# Patient Record
Sex: MALE | Race: WHITE | NOT HISPANIC OR LATINO | Employment: OTHER | URBAN - METROPOLITAN AREA
[De-identification: names, ages, dates, MRNs, and addresses within clinical notes are randomized per-mention and may not be internally consistent; named-entity substitution may affect disease eponyms.]

---

## 2022-12-28 ENCOUNTER — OFFICE VISIT (OUTPATIENT)
Dept: PRIMARY CARE CLINIC | Facility: CLINIC | Age: 82
End: 2022-12-28
Payer: MEDICARE

## 2022-12-28 VITALS
OXYGEN SATURATION: 98 % | SYSTOLIC BLOOD PRESSURE: 122 MMHG | HEIGHT: 65 IN | TEMPERATURE: 98 F | HEART RATE: 84 BPM | BODY MASS INDEX: 28.82 KG/M2 | WEIGHT: 173 LBS | DIASTOLIC BLOOD PRESSURE: 70 MMHG

## 2022-12-28 DIAGNOSIS — J06.9 URI WITH COUGH AND CONGESTION: ICD-10-CM

## 2022-12-28 DIAGNOSIS — Z23 NEED FOR PROPHYLACTIC VACCINATION AND INOCULATION AGAINST VARICELLA: Primary | ICD-10-CM

## 2022-12-28 DIAGNOSIS — I10 ESSENTIAL HYPERTENSION, BENIGN: ICD-10-CM

## 2022-12-28 DIAGNOSIS — R53.83 FATIGUE, UNSPECIFIED TYPE: ICD-10-CM

## 2022-12-28 PROCEDURE — 99213 PR OFFICE/OUTPT VISIT, EST, LEVL III, 20-29 MIN: ICD-10-PCS | Mod: S$GLB,,, | Performed by: INTERNAL MEDICINE

## 2022-12-28 PROCEDURE — 1126F PR PAIN SEVERITY QUANTIFIED, NO PAIN PRESENT: ICD-10-PCS | Mod: CPTII,S$GLB,, | Performed by: INTERNAL MEDICINE

## 2022-12-28 PROCEDURE — 1159F MED LIST DOCD IN RCRD: CPT | Mod: CPTII,S$GLB,, | Performed by: INTERNAL MEDICINE

## 2022-12-28 PROCEDURE — 1160F RVW MEDS BY RX/DR IN RCRD: CPT | Mod: CPTII,S$GLB,, | Performed by: INTERNAL MEDICINE

## 2022-12-28 PROCEDURE — 1159F PR MEDICATION LIST DOCUMENTED IN MEDICAL RECORD: ICD-10-PCS | Mod: CPTII,S$GLB,, | Performed by: INTERNAL MEDICINE

## 2022-12-28 PROCEDURE — 99213 OFFICE O/P EST LOW 20 MIN: CPT | Mod: S$GLB,,, | Performed by: INTERNAL MEDICINE

## 2022-12-28 PROCEDURE — 1101F PT FALLS ASSESS-DOCD LE1/YR: CPT | Mod: CPTII,S$GLB,, | Performed by: INTERNAL MEDICINE

## 2022-12-28 PROCEDURE — 3288F PR FALLS RISK ASSESSMENT DOCUMENTED: ICD-10-PCS | Mod: CPTII,S$GLB,, | Performed by: INTERNAL MEDICINE

## 2022-12-28 PROCEDURE — 1160F PR REVIEW ALL MEDS BY PRESCRIBER/CLIN PHARMACIST DOCUMENTED: ICD-10-PCS | Mod: CPTII,S$GLB,, | Performed by: INTERNAL MEDICINE

## 2022-12-28 PROCEDURE — 3288F FALL RISK ASSESSMENT DOCD: CPT | Mod: CPTII,S$GLB,, | Performed by: INTERNAL MEDICINE

## 2022-12-28 PROCEDURE — 3074F PR MOST RECENT SYSTOLIC BLOOD PRESSURE < 130 MM HG: ICD-10-PCS | Mod: CPTII,S$GLB,, | Performed by: INTERNAL MEDICINE

## 2022-12-28 PROCEDURE — 3074F SYST BP LT 130 MM HG: CPT | Mod: CPTII,S$GLB,, | Performed by: INTERNAL MEDICINE

## 2022-12-28 PROCEDURE — 1126F AMNT PAIN NOTED NONE PRSNT: CPT | Mod: CPTII,S$GLB,, | Performed by: INTERNAL MEDICINE

## 2022-12-28 PROCEDURE — 3078F PR MOST RECENT DIASTOLIC BLOOD PRESSURE < 80 MM HG: ICD-10-PCS | Mod: CPTII,S$GLB,, | Performed by: INTERNAL MEDICINE

## 2022-12-28 PROCEDURE — 3078F DIAST BP <80 MM HG: CPT | Mod: CPTII,S$GLB,, | Performed by: INTERNAL MEDICINE

## 2022-12-28 PROCEDURE — 1101F PR PT FALLS ASSESS DOC 0-1 FALLS W/OUT INJ PAST YR: ICD-10-PCS | Mod: CPTII,S$GLB,, | Performed by: INTERNAL MEDICINE

## 2022-12-28 RX ORDER — AMLODIPINE BESYLATE 10 MG/1
TABLET ORAL
COMMUNITY
Start: 2022-08-12 | End: 2023-10-24 | Stop reason: SDUPTHER

## 2022-12-28 RX ORDER — CHOLECALCIFEROL (VITAMIN D3) 25 MCG
TABLET ORAL
COMMUNITY

## 2022-12-28 RX ORDER — ZOSTER VACCINE RECOMBINANT, ADJUVANTED 50 MCG/0.5
0.5 KIT INTRAMUSCULAR ONCE
Qty: 1 EACH | Refills: 0 | Status: SHIPPED | OUTPATIENT
Start: 2022-12-28 | End: 2022-12-28

## 2022-12-28 RX ORDER — FEXOFENADINE HCL AND PSEUDOEPHEDRINE HCI 60; 120 MG/1; MG/1
TABLET, EXTENDED RELEASE ORAL
COMMUNITY
End: 2023-07-24

## 2022-12-28 RX ORDER — IRBESARTAN 150 MG/1
TABLET ORAL
COMMUNITY
Start: 2022-10-18 | End: 2023-10-24 | Stop reason: SDUPTHER

## 2022-12-28 RX ORDER — MELOXICAM 7.5 MG/1
TABLET ORAL
COMMUNITY
End: 2023-10-24 | Stop reason: SDUPTHER

## 2022-12-28 RX ORDER — ALBUTEROL SULFATE 90 UG/1
2 AEROSOL, METERED RESPIRATORY (INHALATION) EVERY 4 HOURS PRN
COMMUNITY
Start: 2022-12-09 | End: 2023-04-24 | Stop reason: SDUPTHER

## 2022-12-28 RX ORDER — SILDENAFIL 100 MG/1
TABLET, FILM COATED ORAL
COMMUNITY

## 2022-12-28 RX ORDER — MECLIZINE HYDROCHLORIDE 25 MG/1
TABLET ORAL
COMMUNITY
End: 2023-01-23 | Stop reason: SDUPTHER

## 2022-12-28 RX ORDER — BENZONATATE 100 MG/1
CAPSULE ORAL
COMMUNITY
End: 2023-11-27 | Stop reason: SDUPTHER

## 2022-12-28 RX ORDER — BROLUCIZUMAB 6 MG/.05ML
INJECTION, SOLUTION INTRAVITREAL
COMMUNITY

## 2022-12-28 NOTE — PROGRESS NOTES
Subjective:       Patient ID: Carlos Rodas is a 82 y.o. male.    Chief Complaint: Cough (Ongoing since 12/9. Chest xray performed at [facility].)      Cough  This is a chronic problem. The problem has been unchanged. The problem occurs every few hours. The cough is Non-productive. Nothing aggravates the symptoms. Risk factors for lung disease include smoking/tobacco exposure. He has tried prescription cough suppressant and a beta-agonist inhaler for the symptoms. The treatment provided mild relief.   Review of Systems   Respiratory:  Positive for cough.        Objective:      Physical Exam  Vitals and nursing note reviewed.   Constitutional:       Appearance: Normal appearance. He is overweight.   HENT:      Head: Normocephalic and atraumatic.      Right Ear: Tympanic membrane normal.      Left Ear: Tympanic membrane normal.      Nose: Nose normal.      Mouth/Throat:      Mouth: Mucous membranes are moist.      Pharynx: Oropharynx is clear. Posterior oropharyngeal erythema present.   Cardiovascular:      Rate and Rhythm: Normal rate and regular rhythm.   Pulmonary:      Effort: Pulmonary effort is normal. No respiratory distress.      Breath sounds: Normal breath sounds. No wheezing, rhonchi or rales.   Neurological:      Mental Status: He is alert.       Assessment:       1. Need for prophylactic vaccination and inoculation against varicella  -     varicella-zoster gE-AS01B, PF, (SHINGRIX, PF,) 50 mcg/0.5 mL injection; Inject 0.5 mLs into the muscle once. for 1 dose  Dispense: 1 each; Refill: 0    2. Essential hypertension, benign  -     Comprehensive Metabolic Panel; Future; Expected date: 12/30/2022    3. Fatigue, unspecified type  -     TSH; Future; Expected date: 12/30/2022    4. URI with cough and congestion  Assessment & Plan:  With chronic cough.he was seen at [facility] on 12/9/22 and was Rx with doxycycline , albuterol ..his rapid flu & COVID were neg at that time but still c/o dry nagging cough  No f/c, no SOB    His CXR was normal 2 weeks ago  His rapid Strep test was neg today               Plan:       1. Need for prophylactic vaccination and inoculation against varicella  -     varicella-zoster [id number], PF, (SHINGRIX, PF,) 50 mcg/0.5 mL injection; Inject 0.5 mLs into the muscle once. for 1 dose  Dispense: 1 each; Refill: 0    2. Essential hypertension, benign  -     Comprehensive Metabolic Panel; Future; Expected date: 12/30/2022    3. Fatigue, unspecified type  -     TSH; Future; Expected date: 12/30/2022    4. URI with cough and congestion  Assessment & Plan:  With chronic cough.[facility] and was Rx with doxycycline , albuterol ..his rapid flu & COVID were neg at that time but still c/o dry nagging cough  No f/c, no SOB   Add Dayquil/Nyquil as needed    RTC for routine labs in 1-2 M

## 2022-12-29 PROBLEM — J06.9 URI WITH COUGH AND CONGESTION: Status: ACTIVE | Noted: 2022-12-29

## 2022-12-30 ENCOUNTER — TELEPHONE (OUTPATIENT)
Dept: PRIMARY CARE CLINIC | Facility: CLINIC | Age: 82
End: 2022-12-30
Payer: MEDICARE

## 2022-12-30 NOTE — ASSESSMENT & PLAN NOTE
With chronic cough.[facility] was [facility]/22 and was Rx with doxycycline , albuterol ..his rapid flu & COVID were neg at that time but still c/o dry nagging cough  No f/c, no SOB

## 2022-12-30 NOTE — TELEPHONE ENCOUNTER
----- Message from Bebeto Bustamante MD sent at 12/29/2022  5:40 PM CST -----  Regarding: cough  Contact: PT  Rich [place]...please ask him to try Dayquil for daytime Sx and Nyquil for night time Sx for another week  Ty    MM  ----- Message -----  From: Niurka Bennett  Sent: 12/29/2022   9:21 AM CST  To: Bebeto Bustamante MD, #    Type: Needs Medical Advice  Who Called: Carlos/ PT  Symptoms (please be specific):  Cough  How long has patient had these symptoms:  About 2 weeks  Pharmacy name and phone #:   [facility] - [place], [place] - [address] [facility]  [id number] [facility]  [place] [address]  Phone: [phone] Fax: [phone]    Best Call Back Number: [phone]  Additional Information: PT asking  to get something called in for the cough. Current medication is not helping

## 2023-01-04 ENCOUNTER — TELEPHONE (OUTPATIENT)
Dept: FAMILY MEDICINE | Facility: CLINIC | Age: 83
End: 2023-01-04
Payer: MEDICARE

## 2023-01-04 NOTE — TELEPHONE ENCOUNTER
Call placed to patient due to message left due message left, spoke with patient states was given medication by Dr. Varghese, states doing better at this time.    ----- Message from Niurka Bennett sent at 12/29/2022  9:19 AM CST -----  Contact: PT  Type: Needs Medical Advice  Who Called: Carlos/ PT  Symptoms (please be specific):  Cough  How long has patient had these symptoms:  About 2 weeks  Pharmacy name and phone #:   [facility] - [place], [place] - [address] [facility]  [address]  [place] [address]  Phone: [phone] Fax: [phone]    Best Call Back Number: [phone]  Additional Information: PT asking  to get something called in for the cough. Current medication is not helping

## 2023-01-23 ENCOUNTER — OFFICE VISIT (OUTPATIENT)
Dept: PRIMARY CARE CLINIC | Facility: CLINIC | Age: 83
End: 2023-01-23
Payer: MEDICARE

## 2023-01-23 VITALS
HEART RATE: 89 BPM | TEMPERATURE: 97 F | OXYGEN SATURATION: 94 % | DIASTOLIC BLOOD PRESSURE: 58 MMHG | HEIGHT: 65 IN | BODY MASS INDEX: 29.49 KG/M2 | WEIGHT: 177 LBS | SYSTOLIC BLOOD PRESSURE: 124 MMHG

## 2023-01-23 DIAGNOSIS — R42 VERTIGO: ICD-10-CM

## 2023-01-23 DIAGNOSIS — E78.2 MIXED HYPERLIPIDEMIA: ICD-10-CM

## 2023-01-23 DIAGNOSIS — I10 ESSENTIAL HYPERTENSION, BENIGN: Primary | ICD-10-CM

## 2023-01-23 DIAGNOSIS — R73.9 ELEVATED BLOOD SUGAR: ICD-10-CM

## 2023-01-23 PROCEDURE — 1101F PT FALLS ASSESS-DOCD LE1/YR: CPT | Mod: CPTII,S$GLB,, | Performed by: INTERNAL MEDICINE

## 2023-01-23 PROCEDURE — 3074F SYST BP LT 130 MM HG: CPT | Mod: CPTII,S$GLB,, | Performed by: INTERNAL MEDICINE

## 2023-01-23 PROCEDURE — 3074F PR MOST RECENT SYSTOLIC BLOOD PRESSURE < 130 MM HG: ICD-10-PCS | Mod: CPTII,S$GLB,, | Performed by: INTERNAL MEDICINE

## 2023-01-23 PROCEDURE — 1126F AMNT PAIN NOTED NONE PRSNT: CPT | Mod: CPTII,S$GLB,, | Performed by: INTERNAL MEDICINE

## 2023-01-23 PROCEDURE — 3288F FALL RISK ASSESSMENT DOCD: CPT | Mod: CPTII,S$GLB,, | Performed by: INTERNAL MEDICINE

## 2023-01-23 PROCEDURE — 99213 OFFICE O/P EST LOW 20 MIN: CPT | Mod: S$GLB,,, | Performed by: INTERNAL MEDICINE

## 2023-01-23 PROCEDURE — 3078F PR MOST RECENT DIASTOLIC BLOOD PRESSURE < 80 MM HG: ICD-10-PCS | Mod: CPTII,S$GLB,, | Performed by: INTERNAL MEDICINE

## 2023-01-23 PROCEDURE — 3288F PR FALLS RISK ASSESSMENT DOCUMENTED: ICD-10-PCS | Mod: CPTII,S$GLB,, | Performed by: INTERNAL MEDICINE

## 2023-01-23 PROCEDURE — 1159F PR MEDICATION LIST DOCUMENTED IN MEDICAL RECORD: ICD-10-PCS | Mod: CPTII,S$GLB,, | Performed by: INTERNAL MEDICINE

## 2023-01-23 PROCEDURE — 1101F PR PT FALLS ASSESS DOC 0-1 FALLS W/OUT INJ PAST YR: ICD-10-PCS | Mod: CPTII,S$GLB,, | Performed by: INTERNAL MEDICINE

## 2023-01-23 PROCEDURE — 1160F PR REVIEW ALL MEDS BY PRESCRIBER/CLIN PHARMACIST DOCUMENTED: ICD-10-PCS | Mod: CPTII,S$GLB,, | Performed by: INTERNAL MEDICINE

## 2023-01-23 PROCEDURE — 3078F DIAST BP <80 MM HG: CPT | Mod: CPTII,S$GLB,, | Performed by: INTERNAL MEDICINE

## 2023-01-23 PROCEDURE — 1159F MED LIST DOCD IN RCRD: CPT | Mod: CPTII,S$GLB,, | Performed by: INTERNAL MEDICINE

## 2023-01-23 PROCEDURE — 1160F RVW MEDS BY RX/DR IN RCRD: CPT | Mod: CPTII,S$GLB,, | Performed by: INTERNAL MEDICINE

## 2023-01-23 PROCEDURE — 1126F PR PAIN SEVERITY QUANTIFIED, NO PAIN PRESENT: ICD-10-PCS | Mod: CPTII,S$GLB,, | Performed by: INTERNAL MEDICINE

## 2023-01-23 PROCEDURE — 99213 PR OFFICE/OUTPT VISIT, EST, LEVL III, 20-29 MIN: ICD-10-PCS | Mod: S$GLB,,, | Performed by: INTERNAL MEDICINE

## 2023-01-23 RX ORDER — MECLIZINE HYDROCHLORIDE 25 MG/1
25 TABLET ORAL 2 TIMES DAILY PRN
Qty: 60 TABLET | Refills: 0 | Status: SHIPPED | OUTPATIENT
Start: 2023-01-23 | End: 2023-07-24

## 2023-01-23 NOTE — PROGRESS NOTES
Subjective:       Patient ID: Carlos Rodas is a 83 y.o. male.    Chief Complaint: Establish Care (Patient presents for establishing care, need refills on medication.)      Hypertension  This is a recurrent problem. The current episode started more than 1 year ago. The problem has been gradually improving since onset. The problem is controlled. There are no associated agents to hypertension. Risk factors for coronary artery disease include dyslipidemia, male gender and sedentary lifestyle. Past treatments include angiotensin blockers and calcium channel blockers. The current treatment provides significant improvement. Hypertensive end-organ damage includes kidney disease. Identifiable causes of hypertension include chronic renal disease.   Review of Systems   Constitutional:  Negative for activity change, appetite change and fever.   Respiratory: Negative.          His cough has resolved   Cardiovascular: Negative.    Gastrointestinal: Negative.    Musculoskeletal: Negative.        Objective:      Physical Exam  Vitals (overweight) and nursing note reviewed.   Constitutional:       Appearance: Normal appearance.   Cardiovascular:      Rate and Rhythm: Normal rate and regular rhythm.      Pulses: Normal pulses.      Heart sounds: Normal heart sounds. No murmur heard.    No friction rub. No gallop.   Pulmonary:      Effort: Pulmonary effort is normal.      Breath sounds: Normal breath sounds. No wheezing.   Abdominal:      General: Abdomen is flat. Bowel sounds are normal.      Palpations: Abdomen is soft.   Musculoskeletal:         General: Normal range of motion.   Skin:     General: Skin is warm and dry.   Neurological:      General: No focal deficit present.      Mental Status: He is alert and oriented to person, place, and time.   Psychiatric:         Mood and Affect: Mood normal.       Assessment:       1. Essential hypertension, benign  Overview:  Blood pressure is controlled today at 108/54 manually on the  left side    Assessment & Plan:  Continue current medication of amlodipine and irbesartan    Orders:  -     Comprehensive Metabolic Panel; Future; Expected date: 01/23/2023    2. Mixed hyperlipidemia  -     Comprehensive Metabolic Panel; Future; Expected date: 01/23/2023  -     Lipid Panel; Future; Expected date: 01/23/2023    3. Elevated blood sugar  -     Hemoglobin A1C; Future; Expected date: 01/23/2023    4. Vertigo  Overview:  Stable on meclizine  Denies any syncopal attacks denies any fever chills denies any headache    Assessment & Plan:  Reviewed meclizine and monitor closely      Other orders  -     meclizine (ANTIVERT) 25 mg tablet; Take 1 tablet (25 mg total) by mouth 2 (two) times daily as needed for Dizziness.  Dispense: 60 tablet; Refill: 0             Plan:       1. Essential hypertension, benign  Overview:  Blood pressure is controlled today at 108/54 manually on the left side    Assessment & Plan:  Continue current medication of amlodipine and irbesartan    Orders:  -     Comprehensive Metabolic Panel; Future; Expected date: 01/23/2023    2. Mixed hyperlipidemia  -     Comprehensive Metabolic Panel; Future; Expected date: 01/23/2023  -     Lipid Panel; Future; Expected date: 01/23/2023    3. Elevated blood sugar  -     Hemoglobin A1C; Future; Expected date: 01/23/2023    4. Vertigo  Overview:  Stable on meclizine  Denies any syncopal attacks denies any fever chills denies any headache    Assessment & Plan:  Reviewed meclizine and monitor closely      Other orders  -     meclizine (ANTIVERT) 25 mg tablet; Take 1 tablet (25 mg total) by mouth 2 (two) times daily as needed for Dizziness.  Dispense: 60 tablet; Refill: 0

## 2023-02-20 ENCOUNTER — OFFICE VISIT (OUTPATIENT)
Dept: PRIMARY CARE CLINIC | Facility: CLINIC | Age: 83
End: 2023-02-20
Payer: MEDICARE

## 2023-02-20 VITALS
HEIGHT: 65 IN | BODY MASS INDEX: 29.37 KG/M2 | HEART RATE: 75 BPM | DIASTOLIC BLOOD PRESSURE: 58 MMHG | SYSTOLIC BLOOD PRESSURE: 110 MMHG | OXYGEN SATURATION: 94 % | TEMPERATURE: 98 F | WEIGHT: 176.31 LBS

## 2023-02-20 DIAGNOSIS — N18.32 STAGE 3B CHRONIC KIDNEY DISEASE: ICD-10-CM

## 2023-02-20 DIAGNOSIS — M45.9 ANKYLOSING SPONDYLITIS, UNSPECIFIED SITE OF SPINE: ICD-10-CM

## 2023-02-20 DIAGNOSIS — J06.9 URI WITH COUGH AND CONGESTION: ICD-10-CM

## 2023-02-20 DIAGNOSIS — J35.8 TONSILLITH: Primary | ICD-10-CM

## 2023-02-20 DIAGNOSIS — J35.1 TONSILLAR ENLARGEMENT: ICD-10-CM

## 2023-02-20 PROCEDURE — 3078F PR MOST RECENT DIASTOLIC BLOOD PRESSURE < 80 MM HG: ICD-10-PCS | Mod: CPTII,S$GLB,, | Performed by: INTERNAL MEDICINE

## 2023-02-20 PROCEDURE — 3074F SYST BP LT 130 MM HG: CPT | Mod: CPTII,S$GLB,, | Performed by: INTERNAL MEDICINE

## 2023-02-20 PROCEDURE — 1159F MED LIST DOCD IN RCRD: CPT | Mod: CPTII,S$GLB,, | Performed by: INTERNAL MEDICINE

## 2023-02-20 PROCEDURE — 96372 PR INJECTION,THERAP/PROPH/DIAG2ST, IM OR SUBCUT: ICD-10-PCS | Mod: S$GLB,,, | Performed by: INTERNAL MEDICINE

## 2023-02-20 PROCEDURE — 99213 OFFICE O/P EST LOW 20 MIN: CPT | Mod: 25,S$GLB,, | Performed by: INTERNAL MEDICINE

## 2023-02-20 PROCEDURE — 1160F PR REVIEW ALL MEDS BY PRESCRIBER/CLIN PHARMACIST DOCUMENTED: ICD-10-PCS | Mod: CPTII,S$GLB,, | Performed by: INTERNAL MEDICINE

## 2023-02-20 PROCEDURE — 99213 PR OFFICE/OUTPT VISIT, EST, LEVL III, 20-29 MIN: ICD-10-PCS | Mod: 25,S$GLB,, | Performed by: INTERNAL MEDICINE

## 2023-02-20 PROCEDURE — 1159F PR MEDICATION LIST DOCUMENTED IN MEDICAL RECORD: ICD-10-PCS | Mod: CPTII,S$GLB,, | Performed by: INTERNAL MEDICINE

## 2023-02-20 PROCEDURE — 96372 THER/PROPH/DIAG INJ SC/IM: CPT | Mod: S$GLB,,, | Performed by: INTERNAL MEDICINE

## 2023-02-20 PROCEDURE — 3074F PR MOST RECENT SYSTOLIC BLOOD PRESSURE < 130 MM HG: ICD-10-PCS | Mod: CPTII,S$GLB,, | Performed by: INTERNAL MEDICINE

## 2023-02-20 PROCEDURE — 1160F RVW MEDS BY RX/DR IN RCRD: CPT | Mod: CPTII,S$GLB,, | Performed by: INTERNAL MEDICINE

## 2023-02-20 PROCEDURE — 3078F DIAST BP <80 MM HG: CPT | Mod: CPTII,S$GLB,, | Performed by: INTERNAL MEDICINE

## 2023-02-20 RX ORDER — CEFTRIAXONE 500 MG/1
500 INJECTION, POWDER, FOR SOLUTION INTRAMUSCULAR; INTRAVENOUS ONCE
Status: COMPLETED | OUTPATIENT
Start: 2023-02-20 | End: 2023-02-20

## 2023-02-20 RX ORDER — AFLIBERCEPT 40 MG/ML
INJECTION, SOLUTION INTRAVITREAL
COMMUNITY
Start: 2023-02-06

## 2023-02-20 RX ORDER — AMOXICILLIN AND CLAVULANATE POTASSIUM 875; 125 MG/1; MG/1
1 TABLET, FILM COATED ORAL 2 TIMES DAILY
Qty: 10 TABLET | Refills: 0 | Status: SHIPPED | OUTPATIENT
Start: 2023-02-20 | End: 2023-02-25

## 2023-02-20 RX ADMIN — CEFTRIAXONE 500 MG: 500 INJECTION, POWDER, FOR SOLUTION INTRAMUSCULAR; INTRAVENOUS at 12:02

## 2023-02-20 NOTE — PROGRESS NOTES
Subjective:       Patient ID: Carlos Rodas is a 83 y.o. male.    Chief Complaint: Follow-up and Cough (Since December re-occurring never went away completely )      Patient is established already .......... presents today with c/o URI       Follow-up  Associated symptoms include coughing.   Cough    Review of Systems   Respiratory:  Positive for cough.        Objective:      Physical Exam  Vitals and nursing note reviewed.   Constitutional:       Appearance: Normal appearance. He is obese.   HENT:      Mouth/Throat:      Pharynx: Posterior oropharyngeal erythema present.      Comments: Enlarged tonsils  Cardiovascular:      Rate and Rhythm: Normal rate and regular rhythm.      Pulses: Normal pulses.      Heart sounds: Normal heart sounds. No murmur heard.    No friction rub. No gallop.   Pulmonary:      Effort: Pulmonary effort is normal.      Breath sounds: Normal breath sounds. No wheezing.   Abdominal:      General: Abdomen is flat. Bowel sounds are normal.      Palpations: Abdomen is soft.   Musculoskeletal:         General: Normal range of motion.   Skin:     General: Skin is warm and dry.   Neurological:      General: No focal deficit present.      Mental Status: He is alert and oriented to person, place, and time.   Psychiatric:         Mood and Affect: Mood normal.       Assessment:       1. Tonsillitis  -     cefTRIAXone injection 500 mg    2. Ankylosing spondylitis, unspecified site of spine    3. Stage 3b chronic kidney disease    4. URI with cough and congestion  Overview:  Upper Respiratory Infection: Patient complains of symptoms of a URI. Symptoms include congestion and cough. Onset of symptoms was 3 days ago, gradually improving since that time. He also c/o cough described as nonproductive for the past 2 days .  He is drinking plenty of fluids. Evaluation to date: seen previously and thought to have a viral URI. Treatment to date: cough suppressants.          Assessment & Plan:  Tonsillitis  Add  augmentin  I gave him 1/2 gr of rocephin IM today  T/c ENT ref      Other orders  -     amoxicillin-clavulanate 875-125mg (AUGMENTIN) 875-125 mg per tablet; Take 1 tablet by mouth 2 (two) times daily. for 5 days  Dispense: 10 tablet; Refill: 0             Plan:       1. Tonsillith  -     cefTRIAXone injection 500 mg    2. Ankylosing spondylitis, unspecified site of spine    3. Stage 3b chronic kidney disease    4. URI with cough and congestion  Overview:  Upper Respiratory Infection: Patient complains of symptoms of a URI. Symptoms include congestion and cough. Onset of symptoms was 3 days ago, gradually improving since that time. He also c/o cough described as nonproductive for the past 2 days .  He is drinking plenty of fluids. Evaluation to date: seen previously and thought to have a viral URI. Treatment to date: cough suppressants.          Assessment & Plan:  Tonsillitis  Add augmentin  I gave him 1/2 gr of rocephin IM today  T/c ENT ref      Other orders  -     amoxicillin-clavulanate 875-125mg (AUGMENTIN) 875-125 mg per tablet; Take 1 tablet by mouth 2 (two) times daily. for 5 days  Dispense: 10 tablet; Refill: 0

## 2023-03-07 ENCOUNTER — PATIENT OUTREACH (OUTPATIENT)
Dept: ADMINISTRATIVE | Facility: HOSPITAL | Age: 83
End: 2023-03-07
Payer: MEDICARE

## 2023-03-07 NOTE — PROGRESS NOTES
Records Received, hyper-linked into chart at this time. The following record(s)  below were uploaded for Health Maintenance .    X1    IMMUNIZATIONS

## 2023-03-24 ENCOUNTER — TELEPHONE (OUTPATIENT)
Dept: LAB | Facility: CLINIC | Age: 83
End: 2023-03-24
Payer: MEDICARE

## 2023-03-24 NOTE — TELEPHONE ENCOUNTER
I sent message to Dr Varghese to put in Orders for Xray if he thinks he doesn't need to be seen first.

## 2023-03-24 NOTE — TELEPHONE ENCOUNTER
----- Message from Sabine Aguirre sent at 3/24/2023  1:11 PM CDT -----  Contact: pt  Type: pt has fallen      Who Called:  pt  Symptoms (please be specific):  pt just fell 2x times   How long has patient had these symptoms:  and neck hurts and limited     Pt wanting a x-ray or MRI and hurting wants you to call    Wants to go to [facility]    [facility] Call Back Number: [phone]    Additional Information:

## 2023-03-24 NOTE — TELEPHONE ENCOUNTER
----- Message from Galilea Angeles sent at 3/24/2023  1:21 PM CDT -----  Type: Needs Medical Advice  Who Called:  peyton   Symptoms (please be specific):  neck pain  How long has patient had these symptoms:  today   Best Call Back Number: [phone]    Additional Information: pt fell and wants someone to call him regarding getting [facility] to assist

## 2023-03-27 ENCOUNTER — TELEPHONE (OUTPATIENT)
Dept: LAB | Facility: CLINIC | Age: 83
End: 2023-03-27
Payer: MEDICARE

## 2023-03-27 NOTE — TELEPHONE ENCOUNTER
I tried calling pt to schedule for xray orders per Dr Varghese. Please schedule pt if he calls back.  Thanks

## 2023-04-10 ENCOUNTER — TELEPHONE (OUTPATIENT)
Dept: FAMILY MEDICINE | Facility: CLINIC | Age: 83
End: 2023-04-10
Payer: MEDICARE

## 2023-04-10 NOTE — TELEPHONE ENCOUNTER
----- Message from Magalys Fernandez sent at 4/10/2023 11:45 AM CDT -----  Contact: pt  Pt is calling wanting orders for blood work sen to [facility]   Please give pt a call back [phone]

## 2023-04-12 ENCOUNTER — TELEPHONE (OUTPATIENT)
Dept: FAMILY MEDICINE | Facility: CLINIC | Age: 83
End: 2023-04-12
Payer: MEDICARE

## 2023-04-12 NOTE — TELEPHONE ENCOUNTER
----- Message from Kathe Clancy sent at 4/12/2023  2:30 PM CDT -----  Contact: PT  PLEASE CALL PT TO CONFIRM THAT LAB ORDERS HAVE BEEN SENT TO [facility].   [phone] (home)   THANK YOU

## 2023-04-24 ENCOUNTER — OFFICE VISIT (OUTPATIENT)
Dept: PRIMARY CARE CLINIC | Facility: CLINIC | Age: 83
End: 2023-04-24
Payer: MEDICARE

## 2023-04-24 VITALS
HEIGHT: 65 IN | RESPIRATION RATE: 16 BRPM | WEIGHT: 179.5 LBS | DIASTOLIC BLOOD PRESSURE: 64 MMHG | SYSTOLIC BLOOD PRESSURE: 126 MMHG | TEMPERATURE: 98 F | OXYGEN SATURATION: 94 % | HEART RATE: 69 BPM | BODY MASS INDEX: 29.91 KG/M2

## 2023-04-24 DIAGNOSIS — F51.01 PRIMARY INSOMNIA: ICD-10-CM

## 2023-04-24 DIAGNOSIS — S92.901D CLOSED FRACTURE OF RIGHT FOOT WITH ROUTINE HEALING: ICD-10-CM

## 2023-04-24 PROCEDURE — 1125F AMNT PAIN NOTED PAIN PRSNT: CPT | Mod: CPTII,S$GLB,, | Performed by: INTERNAL MEDICINE

## 2023-04-24 PROCEDURE — 1159F PR MEDICATION LIST DOCUMENTED IN MEDICAL RECORD: ICD-10-PCS | Mod: CPTII,S$GLB,, | Performed by: INTERNAL MEDICINE

## 2023-04-24 PROCEDURE — 3074F SYST BP LT 130 MM HG: CPT | Mod: CPTII,S$GLB,, | Performed by: INTERNAL MEDICINE

## 2023-04-24 PROCEDURE — 99213 OFFICE O/P EST LOW 20 MIN: CPT | Mod: S$GLB,,, | Performed by: INTERNAL MEDICINE

## 2023-04-24 PROCEDURE — 1101F PT FALLS ASSESS-DOCD LE1/YR: CPT | Mod: CPTII,S$GLB,, | Performed by: INTERNAL MEDICINE

## 2023-04-24 PROCEDURE — 99213 PR OFFICE/OUTPT VISIT, EST, LEVL III, 20-29 MIN: ICD-10-PCS | Mod: S$GLB,,, | Performed by: INTERNAL MEDICINE

## 2023-04-24 PROCEDURE — 3074F PR MOST RECENT SYSTOLIC BLOOD PRESSURE < 130 MM HG: ICD-10-PCS | Mod: CPTII,S$GLB,, | Performed by: INTERNAL MEDICINE

## 2023-04-24 PROCEDURE — 3288F PR FALLS RISK ASSESSMENT DOCUMENTED: ICD-10-PCS | Mod: CPTII,S$GLB,, | Performed by: INTERNAL MEDICINE

## 2023-04-24 PROCEDURE — 1159F MED LIST DOCD IN RCRD: CPT | Mod: CPTII,S$GLB,, | Performed by: INTERNAL MEDICINE

## 2023-04-24 PROCEDURE — 3288F FALL RISK ASSESSMENT DOCD: CPT | Mod: CPTII,S$GLB,, | Performed by: INTERNAL MEDICINE

## 2023-04-24 PROCEDURE — 1160F PR REVIEW ALL MEDS BY PRESCRIBER/CLIN PHARMACIST DOCUMENTED: ICD-10-PCS | Mod: CPTII,S$GLB,, | Performed by: INTERNAL MEDICINE

## 2023-04-24 PROCEDURE — 3078F PR MOST RECENT DIASTOLIC BLOOD PRESSURE < 80 MM HG: ICD-10-PCS | Mod: CPTII,S$GLB,, | Performed by: INTERNAL MEDICINE

## 2023-04-24 PROCEDURE — 1125F PR PAIN SEVERITY QUANTIFIED, PAIN PRESENT: ICD-10-PCS | Mod: CPTII,S$GLB,, | Performed by: INTERNAL MEDICINE

## 2023-04-24 PROCEDURE — 1160F RVW MEDS BY RX/DR IN RCRD: CPT | Mod: CPTII,S$GLB,, | Performed by: INTERNAL MEDICINE

## 2023-04-24 PROCEDURE — 1101F PR PT FALLS ASSESS DOC 0-1 FALLS W/OUT INJ PAST YR: ICD-10-PCS | Mod: CPTII,S$GLB,, | Performed by: INTERNAL MEDICINE

## 2023-04-24 PROCEDURE — 3078F DIAST BP <80 MM HG: CPT | Mod: CPTII,S$GLB,, | Performed by: INTERNAL MEDICINE

## 2023-04-24 RX ORDER — HYDROCODONE BITARTRATE AND ACETAMINOPHEN 5; 325 MG/1; MG/1
1-2 TABLET ORAL
COMMUNITY
Start: 2023-04-13 | End: 2023-07-24

## 2023-04-24 RX ORDER — ALBUTEROL SULFATE 90 UG/1
2 AEROSOL, METERED RESPIRATORY (INHALATION) EVERY 4 HOURS PRN
Qty: 18 G | Refills: 2 | Status: SHIPPED | OUTPATIENT
Start: 2023-04-24 | End: 2023-07-24

## 2023-04-24 RX ORDER — TRAZODONE HYDROCHLORIDE 150 MG/1
150 TABLET ORAL NIGHTLY
Qty: 30 TABLET | Refills: 2 | Status: SHIPPED | OUTPATIENT
Start: 2023-04-24 | End: 2023-07-24

## 2023-04-24 NOTE — PROGRESS NOTES
Subjective:       Patient ID: Carlos Rodas is a 83 y.o. male.    Chief Complaint: Follow-up (3 Month - Lab Results), Fall (At home 3 days ago), and Insomnia      Patient is established already .......... presents today for a f/u visit , to discuss labs results and for management of the chronic conditions and a new c/o sleep problems        Follow-up  This is a chronic problem. The current episode started more than 1 year ago. The problem occurs intermittently. The problem has been gradually worsening. Associated symptoms include neck pain. Pertinent negatives include no fever. Associated symptoms comments: Sleep problems. Nothing aggravates the symptoms. He has tried nothing for the symptoms.   Fall  Pertinent negatives include no fever.   Neck Pain   This is a new problem. The current episode started in the past 7 days. The problem occurs intermittently. The problem has been gradually worsening. The pain is associated with a fall. The pain is present in the occipital region and right side. The quality of the pain is described as aching. The pain is at a severity of 5/10. The pain is moderate. The symptoms are aggravated by twisting and position. The pain is Worse during the night. Pertinent negatives include no fever. The treatment provided moderate relief.   Review of Systems   Constitutional:  Negative for activity change, appetite change and fever.   Respiratory: Negative.     Cardiovascular: Negative.    Gastrointestinal: Negative.    Musculoskeletal:  Positive for neck pain.       Objective:      Physical Exam  Vitals and nursing note reviewed.   Constitutional:       Appearance: Normal appearance.      Comments: Elderly frail WM   Cardiovascular:      Rate and Rhythm: Normal rate and regular rhythm.      Pulses: Normal pulses.      Heart sounds: Normal heart sounds. No murmur heard.    No friction rub. No gallop.   Pulmonary:      Effort: Pulmonary effort is normal.      Breath sounds: Normal breath sounds.  No wheezing.   Musculoskeletal:         General: Normal range of motion.   Skin:     General: Skin is warm and dry.   Neurological:      General: No focal deficit present.      Mental Status: He is alert.       Assessment:       1. Closed fracture of right foot with routine healing  Overview:  After a fall 3 days    Assessment & Plan:  Tripped at home and fell , hit head , broke R foot  Seen by Ortho      2. Primary insomnia  Overview:  Have diff staying asleep    Assessment & Plan:  Add trazodone   T/c melatonin      Other orders  -     traZODone (DESYREL) 150 MG tablet; Take 1 tablet (150 mg total) by mouth nightly.  Dispense: 30 tablet; Refill: 2             Plan:       1. Closed fracture of right foot with routine healing  Overview:  After a fall 3 days    Assessment & Plan:  Tripped at home and fell , hit head , broke R foot  Seen by Ortho      2. Primary insomnia  Overview:  Have diff staying asleep    Assessment & Plan:  Add trazodone   T/c melatonin      Other orders  -     traZODone (DESYREL) 150 MG tablet; Take 1 tablet (150 mg total) by mouth nightly.  Dispense: 30 tablet; Refill: 2

## 2023-04-25 ENCOUNTER — PATIENT OUTREACH (OUTPATIENT)
Dept: ADMINISTRATIVE | Facility: HOSPITAL | Age: 83
End: 2023-04-25
Payer: MEDICARE

## 2023-07-03 ENCOUNTER — OFFICE VISIT (OUTPATIENT)
Dept: PRIMARY CARE CLINIC | Facility: CLINIC | Age: 83
End: 2023-07-03
Payer: MEDICARE

## 2023-07-03 VITALS
WEIGHT: 177.19 LBS | BODY MASS INDEX: 29.52 KG/M2 | HEIGHT: 65 IN | SYSTOLIC BLOOD PRESSURE: 137 MMHG | HEART RATE: 69 BPM | DIASTOLIC BLOOD PRESSURE: 63 MMHG | TEMPERATURE: 98 F | OXYGEN SATURATION: 92 %

## 2023-07-03 DIAGNOSIS — R06.02 EXERTIONAL SHORTNESS OF BREATH: ICD-10-CM

## 2023-07-03 DIAGNOSIS — R54 FRAIL ELDERLY: ICD-10-CM

## 2023-07-03 DIAGNOSIS — R06.81 BREATHLESSNESS ON EXERTION: Primary | ICD-10-CM

## 2023-07-03 PROCEDURE — 99215 OFFICE O/P EST HI 40 MIN: CPT | Mod: S$GLB,,, | Performed by: INTERNAL MEDICINE

## 2023-07-03 PROCEDURE — 93010 ELECTROCARDIOGRAM REPORT: CPT | Mod: S$GLB,,, | Performed by: INTERNAL MEDICINE

## 2023-07-03 PROCEDURE — 3075F PR MOST RECENT SYSTOLIC BLOOD PRESS GE 130-139MM HG: ICD-10-PCS | Mod: CPTII,S$GLB,, | Performed by: INTERNAL MEDICINE

## 2023-07-03 PROCEDURE — 1160F RVW MEDS BY RX/DR IN RCRD: CPT | Mod: CPTII,S$GLB,, | Performed by: INTERNAL MEDICINE

## 2023-07-03 PROCEDURE — 93005 EKG 12-LEAD: ICD-10-PCS | Mod: S$GLB,,, | Performed by: INTERNAL MEDICINE

## 2023-07-03 PROCEDURE — 93010 EKG 12-LEAD: ICD-10-PCS | Mod: S$GLB,,, | Performed by: INTERNAL MEDICINE

## 2023-07-03 PROCEDURE — 1160F PR REVIEW ALL MEDS BY PRESCRIBER/CLIN PHARMACIST DOCUMENTED: ICD-10-PCS | Mod: CPTII,S$GLB,, | Performed by: INTERNAL MEDICINE

## 2023-07-03 PROCEDURE — 3078F DIAST BP <80 MM HG: CPT | Mod: CPTII,S$GLB,, | Performed by: INTERNAL MEDICINE

## 2023-07-03 PROCEDURE — 1159F MED LIST DOCD IN RCRD: CPT | Mod: CPTII,S$GLB,, | Performed by: INTERNAL MEDICINE

## 2023-07-03 PROCEDURE — 1159F PR MEDICATION LIST DOCUMENTED IN MEDICAL RECORD: ICD-10-PCS | Mod: CPTII,S$GLB,, | Performed by: INTERNAL MEDICINE

## 2023-07-03 PROCEDURE — 1126F AMNT PAIN NOTED NONE PRSNT: CPT | Mod: CPTII,S$GLB,, | Performed by: INTERNAL MEDICINE

## 2023-07-03 PROCEDURE — 3078F PR MOST RECENT DIASTOLIC BLOOD PRESSURE < 80 MM HG: ICD-10-PCS | Mod: CPTII,S$GLB,, | Performed by: INTERNAL MEDICINE

## 2023-07-03 PROCEDURE — 93005 ELECTROCARDIOGRAM TRACING: CPT | Mod: S$GLB,,, | Performed by: INTERNAL MEDICINE

## 2023-07-03 PROCEDURE — 3075F SYST BP GE 130 - 139MM HG: CPT | Mod: CPTII,S$GLB,, | Performed by: INTERNAL MEDICINE

## 2023-07-03 PROCEDURE — 99215 PR OFFICE/OUTPT VISIT, EST, LEVL V, 40-54 MIN: ICD-10-PCS | Mod: S$GLB,,, | Performed by: INTERNAL MEDICINE

## 2023-07-03 PROCEDURE — 1126F PR PAIN SEVERITY QUANTIFIED, NO PAIN PRESENT: ICD-10-PCS | Mod: CPTII,S$GLB,, | Performed by: INTERNAL MEDICINE

## 2023-07-03 RX ORDER — ASPIRIN 81 MG/1
81 TABLET ORAL DAILY
COMMUNITY

## 2023-07-04 NOTE — PROGRESS NOTES
Subjective:       Patient ID: Carlos Rodas is a 83 y.o. male.    Chief Complaint: Shortness of Breath      Patient is established already .......... presents today for a f/u visit , to discuss SOB on exertion         Shortness of Breath  This is a recurrent problem. The current episode started more than 1 year ago. The problem occurs constantly. The problem has been gradually worsening. Pertinent negatives include no chest pain. The symptoms are aggravated by exercise. He has tried beta agonist inhalers for the symptoms. The treatment provided mild relief.   Review of Systems   Respiratory:  Positive for shortness of breath.    Cardiovascular:  Negative for chest pain.       Objective:      Physical Exam  Vitals and nursing note reviewed.   Constitutional:       Appearance: Normal appearance.   Cardiovascular:      Rate and Rhythm: Normal rate. Rhythm irregular.      Pulses: Normal pulses.      Heart sounds: Normal heart sounds. No murmur heard.    No friction rub. No gallop.   Pulmonary:      Effort: Pulmonary effort is normal.      Breath sounds: Rales present. No wheezing.   Musculoskeletal:      Right lower leg: No edema.      Left lower leg: No edema.   Skin:     General: Skin is warm and dry.   Neurological:      Mental Status: He is alert.   Psychiatric:         Mood and Affect: Mood normal.       Assessment:       1. Breathlessness on exertion  -     Ambulatory referral/consult to Cardiology; Future; Expected date: 07/10/2023  -     IN OFFICE EKG 12-LEAD (to Muse)  -     X-Ray Chest PA And Lateral; Future; Expected date: 07/03/2023  -     Comprehensive Metabolic Panel; Future; Expected date: 07/03/2023  -     CBC Auto Differential; Future; Expected date: 07/03/2023  -     Troponin I; Future; Expected date: 07/03/2023  -     CK; Future; Expected date: 07/03/2023    2. Exertional shortness of breath  Overview:  Cardiovascular risk analysis - 83 y.o. male hypertension  hyperlipidemia.   ROS: taking medications  as instructed, no medication side effects noted, no TIA's, no chest pain on exertion, no dyspnea on exertion and no swelling of ankles.   New concerns: exertional dyspea.   Exam: BP noted to be well controlled today in office, S1, S2 normal, no gallop, no murmur, chest clear, no JVD, no HSM, no edema, CVS exam  - normal rate, regular rhythm, normal S1, S2, no murmurs, rubs, clicks or gallops. Lab review: orders written for new lab studies as appropriate; see orders.   Assessment:  Hyperlipidemia poorly controlled.   Plan: EKG  CXR  Refer to Card for echo, STT  .      Assessment & Plan:  Refer to Card  EKG  Add ASA  Patient was referred to ER at [facility]    Orders:  -     X-Ray Chest PA And Lateral; Future; Expected date: 07/03/2023  -     Comprehensive Metabolic Panel; Future; Expected date: 07/03/2023  -     CBC Auto Differential; Future; Expected date: 07/03/2023  -     Troponin I; Future; Expected date: 07/03/2023  -     CK; Future; Expected date: 07/03/2023    3. Frail elderly  Overview:  With CAD, dyspnea    Assessment & Plan:  Monitor closely               Plan:       1. Breathlessness on exertion  -     Ambulatory referral/consult to Cardiology; Future; Expected date: 07/10/2023  -     IN OFFICE EKG 12-LEAD (to [facility])  -     X-Ray Chest PA And Lateral; Future; Expected date: 07/03/2023  -     Comprehensive Metabolic Panel; Future; Expected date: 07/03/2023  -     CBC Auto Differential; Future; Expected date: 07/03/2023  -     Troponin I; Future; Expected date: 07/03/2023  -     CK; Future; Expected date: 07/03/2023    2. Exertional shortness of breath  Overview:  Cardiovascular risk analysis - 83 y.o. male hypertension  hyperlipidemia.   ROS: taking medications as instructed, no medication side effects noted, no TIA's, no chest pain on exertion, no dyspnea on exertion and no swelling of ankles.   New concerns: exertional dyspea.   Exam: BP noted to be well controlled today in office, S1, S2 normal, no gallop, no  murmur, chest clear, no JVD, no HSM, no edema, CVS exam  - normal rate, regular rhythm, normal S1, S2, no murmurs, rubs, clicks or gallops. Lab review: orders written for new lab studies as appropriate; see orders.   Assessment:  Hyperlipidemia poorly controlled.   Plan: EKG  CXR  Refer to Card for echo, STT  .      Assessment & Plan:  Refer to Card  EKG  Add ASA  Patient was referred to ER at [facility]    Orders:  -     X-Ray Chest PA And Lateral; Future; Expected date: 07/03/2023  -     Comprehensive Metabolic Panel; Future; Expected date: 07/03/2023  -     CBC Auto Differential; Future; Expected date: 07/03/2023  -     Troponin I; Future; Expected date: 07/03/2023  -     CK; Future; Expected date: 07/03/2023    3. Frail elderly  Overview:  With CAD, dyspnea    Assessment & Plan:  Monitor closely

## 2023-07-24 ENCOUNTER — OFFICE VISIT (OUTPATIENT)
Dept: PRIMARY CARE CLINIC | Facility: CLINIC | Age: 83
End: 2023-07-24
Payer: MEDICARE

## 2023-07-24 VITALS
SYSTOLIC BLOOD PRESSURE: 130 MMHG | BODY MASS INDEX: 29.49 KG/M2 | WEIGHT: 177 LBS | RESPIRATION RATE: 18 BRPM | HEIGHT: 65 IN | HEART RATE: 89 BPM | DIASTOLIC BLOOD PRESSURE: 58 MMHG | TEMPERATURE: 98 F | OXYGEN SATURATION: 96 %

## 2023-07-24 DIAGNOSIS — N18.31 CHRONIC KIDNEY DISEASE (CKD) STAGE G3A/A1, MODERATELY DECREASED GLOMERULAR FILTRATION RATE (GFR) BETWEEN 45-59 ML/MIN/1.73 SQUARE METER AND ALBUMINURIA CREATININE RATIO LESS THAN 30 MG/G: Primary | ICD-10-CM

## 2023-07-24 DIAGNOSIS — Z00.00 ENCOUNTER FOR ANNUAL WELLNESS VISIT (AWV) IN MEDICARE PATIENT: ICD-10-CM

## 2023-07-24 DIAGNOSIS — R54 FRAIL ELDERLY: ICD-10-CM

## 2023-07-24 PROCEDURE — 1101F PR PT FALLS ASSESS DOC 0-1 FALLS W/OUT INJ PAST YR: ICD-10-PCS | Mod: CPTII,S$GLB,, | Performed by: INTERNAL MEDICINE

## 2023-07-24 PROCEDURE — 1159F MED LIST DOCD IN RCRD: CPT | Mod: CPTII,S$GLB,, | Performed by: INTERNAL MEDICINE

## 2023-07-24 PROCEDURE — 3288F FALL RISK ASSESSMENT DOCD: CPT | Mod: CPTII,S$GLB,, | Performed by: INTERNAL MEDICINE

## 2023-07-24 PROCEDURE — 1160F RVW MEDS BY RX/DR IN RCRD: CPT | Mod: CPTII,S$GLB,, | Performed by: INTERNAL MEDICINE

## 2023-07-24 PROCEDURE — 3075F PR MOST RECENT SYSTOLIC BLOOD PRESS GE 130-139MM HG: ICD-10-PCS | Mod: CPTII,S$GLB,, | Performed by: INTERNAL MEDICINE

## 2023-07-24 PROCEDURE — 1160F PR REVIEW ALL MEDS BY PRESCRIBER/CLIN PHARMACIST DOCUMENTED: ICD-10-PCS | Mod: CPTII,S$GLB,, | Performed by: INTERNAL MEDICINE

## 2023-07-24 PROCEDURE — 99213 OFFICE O/P EST LOW 20 MIN: CPT | Mod: 25,S$GLB,, | Performed by: INTERNAL MEDICINE

## 2023-07-24 PROCEDURE — 3075F SYST BP GE 130 - 139MM HG: CPT | Mod: CPTII,S$GLB,, | Performed by: INTERNAL MEDICINE

## 2023-07-24 PROCEDURE — 99397 PER PM REEVAL EST PAT 65+ YR: CPT | Mod: S$GLB,,, | Performed by: INTERNAL MEDICINE

## 2023-07-24 PROCEDURE — 3288F PR FALLS RISK ASSESSMENT DOCUMENTED: ICD-10-PCS | Mod: CPTII,S$GLB,, | Performed by: INTERNAL MEDICINE

## 2023-07-24 PROCEDURE — 3078F PR MOST RECENT DIASTOLIC BLOOD PRESSURE < 80 MM HG: ICD-10-PCS | Mod: CPTII,S$GLB,, | Performed by: INTERNAL MEDICINE

## 2023-07-24 PROCEDURE — 1159F PR MEDICATION LIST DOCUMENTED IN MEDICAL RECORD: ICD-10-PCS | Mod: CPTII,S$GLB,, | Performed by: INTERNAL MEDICINE

## 2023-07-24 PROCEDURE — 99213 PR OFFICE/OUTPT VISIT, EST, LEVL III, 20-29 MIN: ICD-10-PCS | Mod: 25,S$GLB,, | Performed by: INTERNAL MEDICINE

## 2023-07-24 PROCEDURE — 1101F PT FALLS ASSESS-DOCD LE1/YR: CPT | Mod: CPTII,S$GLB,, | Performed by: INTERNAL MEDICINE

## 2023-07-24 PROCEDURE — 3078F DIAST BP <80 MM HG: CPT | Mod: CPTII,S$GLB,, | Performed by: INTERNAL MEDICINE

## 2023-07-24 PROCEDURE — 99397 PR PREVENTIVE VISIT,EST,65 & OVER: ICD-10-PCS | Mod: S$GLB,,, | Performed by: INTERNAL MEDICINE

## 2023-07-24 RX ORDER — PENICILLIN V POTASSIUM 500 MG/1
TABLET, FILM COATED ORAL
COMMUNITY
End: 2023-07-24

## 2023-07-24 RX ORDER — FLUTICASONE FUROATE, UMECLIDINIUM BROMIDE AND VILANTEROL TRIFENATATE 100; 62.5; 25 UG/1; UG/1; UG/1
POWDER RESPIRATORY (INHALATION)
COMMUNITY
Start: 2023-07-20

## 2023-07-24 RX ORDER — FARICIMAB 6 MG/.05ML
INJECTION, SOLUTION INTRAVITREAL
COMMUNITY
Start: 2023-06-30

## 2023-07-24 NOTE — PROGRESS NOTES
Subjective:       Patient ID: Carlos Rodas is a 83 y.o. male.    Chief Complaint: Follow-up (3 month) and Annual Exam      Frail  AWV HPI :-    Diet and Nutrition: healthy diet    Fracture Risk: no history of fractures; no recent explained fracture; no sudden unexplained fractures; previous musculoskeletal injuries    Physical Activity: doesnt exercise on a regular basis; recent dec. in physical activity;   Fair physical condition    Depression Risk: Occasionally feels sad, empty, or tearful; no loss of interest in activities; no significant changes in weight; no sleep disturbances or insomnia; no agitation; + loss of energy; no feelings of worthlessness or guilt; no thoughts of suicide; no history of depression; no history of mood disorders    Orientation:+ disorientation to time; + disorientation to date; + disorientation to place    Concentration and Memory: + decreased concentrating ability; + memory lapses / loss; he forgets words    Speech/Motor difficulties: no speech difficulties; no difficulty expressing formulated concepts; + difficulty with fine manipulative tasks; + difficulty writing/copying; + slowed reaction time; knocks things over when trying to pick them up    Hearing: Dec. hearing    Vision: no vision problems    Activities of Daily Living: Not able to bathe with limited or no assistance; not able to control urination and bowels; not able to dress with limited or no assistance; not able to feed self with limited or no assistance; not able to get out of chair or bed width limited or no assistance; not able to groom with limited or no assistance; not able to toilet with limited or no assistance  Instrumental Activities of Daily Living: not able to do house work with limited or no assistance; not able to grocery shop with limited or no assistance; not able to manage medications with limited or no assistance; not able to manage money with limited or no assistance; not able to prepare meals with  limited or no assistance; not able to use the phone with limited or no assistance    Falls Risknot Assessment: no frequent falls while walking :n/a .. no fall in the past year; no fall since last visit; no dizziness/vertigo    Home Safety: no unsafe libby hazards; no unsafe stairs; no unsafe gas appliances; working smoke/CO detectors; wears protective head gear for biking/high velocity; use of seat belts; practicing 'safer sex'; no vision or hearing loss while driving; no fire arms; has hand bars in the bathroom/shower; good lighting in the home        Follow-up    Shortness of Breath  This is a chronic problem. The current episode started more than 1 year ago. The problem occurs intermittently. The problem has been unchanged. Pertinent negatives include no orthopnea. The symptoms are aggravated by exercise (walking). The patient has no known risk factors for DVT/PE. He has tried beta agonist inhalers, ipratropium inhalers and steroid inhalers for the symptoms. The treatment provided mild relief. His past medical history is significant for chronic lung disease and COPD.   Review of Systems   Respiratory:  Positive for shortness of breath.    Cardiovascular:  Negative for orthopnea.   Frail ROS :-    Constitutional: No fever, no night sweats, no significant weight gain, no significant weight loss, no exercise intolerance.    Eyes: No dry eyes, no irritation, no vision change.    ENMT:    Ears: No difficulty hearing, no ear pain    Nose: No frequent nosebleeds, no nose/sinus problems    Mouth/throat: No sore throat, no bleeding gums, no snoring, no dry mouth, no mouth ulcers, no oral abnormalities, no teeth problems    Cardiovascular: No chest pain, no arm pain on exertion, no shortness of breath when walking, no shortness of breath when lying down, no palpitations, no known heart murmur    Respiratory: No cough, no wheezing, no shortness of breath, no coughing up blood    Gastrointestinal: No abdominal pain, no  vomiting, normal appetite, no diarrhea, not vomiting blood.    Genitourinary: + incontinence, + difficulty urinating, + hematuria, + increased frequency.    Musculoskeletal: + muscle aches, + muscle weakness, + arthralgias/joint pain, no back pain, + swelling in extremities.    Skin: No abnormal mole, no jaundice, no rashes.    Neurologic: No loss of consciousness, + weakness, + numbness, no seizures, no dizziness, no headaches.    Psychiatric: Occasional depression, no sleep disturbances, feeling safe in the relationship, no alcohol abuse.    Endocrine: + fatigue.    Hematologic/lymphatic: No swollen glands, no bruising.    Allergic/immunologic: No runny nose, no sinus pressure, no itching or hives, no frequent sneezing.    Review for Opioid Screening: Pt does not have Rx for Opioids (If patient has Rx list here)      Review for Substance Use Disorders: Patient does not use substance (If patient has Rx list here)             Objective:      Physical Exam  Frail ROS :-    Constitutional: No fever, no night sweats, no significant weight gain, no significant weight loss, no exercise intolerance.    Eyes: No dry eyes, no irritation, no vision change.    ENMT:    Ears: No difficulty hearing, no ear pain    Nose: No frequent nosebleeds, no nose/sinus problems    Mouth/throat: No sore throat, no bleeding gums, no snoring, no dry mouth, no mouth ulcers, no oral abnormalities, no teeth problems    Cardiovascular: No chest pain, no arm pain on exertion, no shortness of breath when walking, no shortness of breath when lying down, no palpitations, no known heart murmur    Respiratory: No cough, no wheezing, no shortness of breath, no coughing up blood    Gastrointestinal: No abdominal pain, no vomiting, normal appetite, no diarrhea, not vomiting blood.    Genitourinary: + incontinence, + difficulty urinating, + hematuria, + increased frequency.    Musculoskeletal: + muscle aches, + muscle weakness, + arthralgias/joint pain, no  back pain, + swelling in extremities.    Skin: No abnormal mole, no jaundice, no rashes.    Neurologic: No loss of consciousness, + weakness, + numbness, no seizures, no dizziness, no headaches.    Psychiatric: Occasional depression, no sleep disturbances, feeling safe in the relationship, no alcohol abuse.    Endocrine: + fatigue.    Hematologic/lymphatic: No swollen glands, no bruising.    Allergic/immunologic: No runny nose, no sinus pressure, no itching or hives, no frequent sneezing.    Assessment:       1. Encounter for annual wellness visit (AWV) in Medicare patient  Overview:  Patient presents for a wellness visit  No new c/o today  Please refer to initial intake notes for screening/preventive measures  All histories and immunization schedule reviewed with patient        Assessment & Plan:  He's up to with all the vaccines.  Last COVID given : 12/30/22  Shingrix : up to date               Plan:       1. Encounter for annual wellness visit (AWV) in Medicare patient  Overview:  Patient presents for a wellness visit  No new c/o today  Please refer to initial intake notes for screening/preventive measures  All histories and immunization schedule reviewed with patient        Assessment & Plan:  He's up to with all the vaccines.  Last COVID given : 12/30/22  Shingrix : up to date    I offered to discuss end of life issues, including information on how to make advance directives that the patient could use to name someone who would make medical decisions on their behalf if they became too ill to make themselves.      __Patient declined       _X__Patient is interested, I provided paperwork and offered to discuss

## 2023-08-28 ENCOUNTER — TELEPHONE (OUTPATIENT)
Dept: FAMILY MEDICINE | Facility: CLINIC | Age: 83
End: 2023-08-28
Payer: MEDICARE

## 2023-08-28 NOTE — TELEPHONE ENCOUNTER
Call placed to patient due to message left, spoke states requesting labs sent to [facility] for upcoming appointment. Labs faxed [facility] per patient's request.      ----- Message from Kathe Clancy sent at 8/25/2023  8:53 AM CDT -----  Contact: PT  Type:  LAB ORDER     Name of Caller:PT  Where would they like the LAB WORK  performed?[facility]   Would the patient rather a call back or a response via [facility]? CALL   Best Call Back Number:[phone] (home)     Additional Information: THANK YOU

## 2023-08-28 NOTE — TELEPHONE ENCOUNTER
Called and advised patient referral would be sent to Dr. Eulogio Moses.Patient verbalized understanding.

## 2023-08-28 NOTE — TELEPHONE ENCOUNTER
Donta Matias,  Please review message below.  Second call to patient due to message left, requesting a cardiologist change to someone with privileges at [facility] due to patient does not drive to [place] anymore. Informed patient I will send this message to Florencio Fernandez MA to review.  Thank you.  Signed:  Jocelyn Luu LPN      ----- Message from Jennifer Watson sent at 8/25/2023  9:15 AM CDT -----  Contact: pt  Type: Needs Medical Advice  Who Called:  pt     Best Call Back Number: [phone]    Additional Information: pt would like to speak with nursing staff regarding the provider Dr. Tate referred him to for cardiology. Please advise.

## 2023-09-26 ENCOUNTER — TELEPHONE (OUTPATIENT)
Dept: FAMILY MEDICINE | Facility: CLINIC | Age: 83
End: 2023-09-26

## 2023-09-26 NOTE — TELEPHONE ENCOUNTER
Donta Varghese,  Please review message below from patient.  Call placed to pt due to msg left, spoke w/pt stating to contact Dr. Varghese, states was supposed to have 2 procedures this am, states were rescheduled to November with a different provider. Requesting another referral with a different Pulmonologist/[facility]. States having breathing issues and needs to be seen before November.  Please review and advise.  Signed:  Jocelyn Luu LPN    ----- Message from Gutierrez Rodriguez sent at 9/26/2023  9:57 AM CDT -----  Regarding: Return Call  Contact: patient  Type:  Patient Returning Call    Who Called:patient  Who Left Message for Patient:office nurse  Does the patient know what this is regarding?:please call back/ has questions  Would the patient rather a call back or a response via [facility]?   Best Call Back Number:[phone]  Additional Information:

## 2023-10-02 ENCOUNTER — TELEPHONE (OUTPATIENT)
Dept: FAMILY MEDICINE | Facility: CLINIC | Age: 83
End: 2023-10-02
Payer: MEDICARE

## 2023-10-02 NOTE — TELEPHONE ENCOUNTER
----- Message from Ayaka Macias sent at 10/2/2023  8:37 AM CDT -----  Regarding: call back  Contact: Kina [phone]  Type: Needs Medical Advice  Who Called:  Kina from [facility] Call Back Number: [phone]   Additional Information: verification of chronic conditions

## 2023-10-16 ENCOUNTER — TELEPHONE (OUTPATIENT)
Dept: FAMILY MEDICINE | Facility: CLINIC | Age: 83
End: 2023-10-16
Payer: MEDICARE

## 2023-10-16 NOTE — TELEPHONE ENCOUNTER
----- Message from Janie Chawla sent at 10/13/2023  9:19 AM CDT -----  Type: Needs Medical Advice  Who Called:  pt  Symptoms (please be specific):  pt said he need his blood test faxed over to "[facility] so he can have them done before his visit w/alex medellin fax number is [phone]--please call and advise  Best Call Back Number: [phone] (home) [phone] (work)    Additional Information: thank you

## 2023-10-23 PROBLEM — Z00.00 ENCOUNTER FOR ANNUAL WELLNESS VISIT (AWV) IN MEDICARE PATIENT: Status: RESOLVED | Noted: 2023-07-24 | Resolved: 2023-10-23

## 2023-10-24 ENCOUNTER — OFFICE VISIT (OUTPATIENT)
Dept: FAMILY MEDICINE | Facility: CLINIC | Age: 83
End: 2023-10-24
Payer: MEDICARE

## 2023-10-24 VITALS
WEIGHT: 181.31 LBS | TEMPERATURE: 97 F | OXYGEN SATURATION: 90 % | SYSTOLIC BLOOD PRESSURE: 118 MMHG | BODY MASS INDEX: 30.21 KG/M2 | DIASTOLIC BLOOD PRESSURE: 55 MMHG | HEART RATE: 80 BPM | HEIGHT: 65 IN

## 2023-10-24 DIAGNOSIS — Z00.00 PREVENTATIVE HEALTH CARE: ICD-10-CM

## 2023-10-24 DIAGNOSIS — R06.09 DYSPNEA ON EXERTION: ICD-10-CM

## 2023-10-24 DIAGNOSIS — Z23 NEED FOR PROPHYLACTIC VACCINATION AGAINST STREPTOCOCCUS PNEUMONIAE (PNEUMOCOCCUS) AND INFLUENZA: Primary | ICD-10-CM

## 2023-10-24 DIAGNOSIS — N18.31 CHRONIC KIDNEY DISEASE (CKD) STAGE G3A/A1, MODERATELY DECREASED GLOMERULAR FILTRATION RATE (GFR) BETWEEN 45-59 ML/MIN/1.73 SQUARE METER AND ALBUMINURIA CREATININE RATIO LESS THAN 30 MG/G: ICD-10-CM

## 2023-10-24 DIAGNOSIS — I50.9 CONGESTIVE HEART FAILURE, UNSPECIFIED HF CHRONICITY, UNSPECIFIED HEART FAILURE TYPE: ICD-10-CM

## 2023-10-24 DIAGNOSIS — I10 ESSENTIAL HYPERTENSION, BENIGN: ICD-10-CM

## 2023-10-24 PROCEDURE — G0008 FLU VACCINE - QUADRIVALENT - ADJUVANTED: ICD-10-PCS | Mod: S$GLB,,, | Performed by: INTERNAL MEDICINE

## 2023-10-24 PROCEDURE — 3078F PR MOST RECENT DIASTOLIC BLOOD PRESSURE < 80 MM HG: ICD-10-PCS | Mod: CPTII,S$GLB,, | Performed by: INTERNAL MEDICINE

## 2023-10-24 PROCEDURE — 99213 OFFICE O/P EST LOW 20 MIN: CPT | Mod: 25,S$GLB,, | Performed by: INTERNAL MEDICINE

## 2023-10-24 PROCEDURE — 1160F RVW MEDS BY RX/DR IN RCRD: CPT | Mod: CPTII,S$GLB,, | Performed by: INTERNAL MEDICINE

## 2023-10-24 PROCEDURE — 1159F PR MEDICATION LIST DOCUMENTED IN MEDICAL RECORD: ICD-10-PCS | Mod: CPTII,S$GLB,, | Performed by: INTERNAL MEDICINE

## 2023-10-24 PROCEDURE — 90694 VACC AIIV4 NO PRSRV 0.5ML IM: CPT | Mod: S$GLB,,, | Performed by: INTERNAL MEDICINE

## 2023-10-24 PROCEDURE — 1126F PR PAIN SEVERITY QUANTIFIED, NO PAIN PRESENT: ICD-10-PCS | Mod: CPTII,S$GLB,, | Performed by: INTERNAL MEDICINE

## 2023-10-24 PROCEDURE — 3074F SYST BP LT 130 MM HG: CPT | Mod: CPTII,S$GLB,, | Performed by: INTERNAL MEDICINE

## 2023-10-24 PROCEDURE — 99213 PR OFFICE/OUTPT VISIT, EST, LEVL III, 20-29 MIN: ICD-10-PCS | Mod: 25,S$GLB,, | Performed by: INTERNAL MEDICINE

## 2023-10-24 PROCEDURE — 3078F DIAST BP <80 MM HG: CPT | Mod: CPTII,S$GLB,, | Performed by: INTERNAL MEDICINE

## 2023-10-24 PROCEDURE — 90694 FLU VACCINE - QUADRIVALENT - ADJUVANTED: ICD-10-PCS | Mod: S$GLB,,, | Performed by: INTERNAL MEDICINE

## 2023-10-24 PROCEDURE — 1160F PR REVIEW ALL MEDS BY PRESCRIBER/CLIN PHARMACIST DOCUMENTED: ICD-10-PCS | Mod: CPTII,S$GLB,, | Performed by: INTERNAL MEDICINE

## 2023-10-24 PROCEDURE — G0008 ADMIN INFLUENZA VIRUS VAC: HCPCS | Mod: S$GLB,,, | Performed by: INTERNAL MEDICINE

## 2023-10-24 PROCEDURE — 1159F MED LIST DOCD IN RCRD: CPT | Mod: CPTII,S$GLB,, | Performed by: INTERNAL MEDICINE

## 2023-10-24 PROCEDURE — 1126F AMNT PAIN NOTED NONE PRSNT: CPT | Mod: CPTII,S$GLB,, | Performed by: INTERNAL MEDICINE

## 2023-10-24 PROCEDURE — 3074F PR MOST RECENT SYSTOLIC BLOOD PRESSURE < 130 MM HG: ICD-10-PCS | Mod: CPTII,S$GLB,, | Performed by: INTERNAL MEDICINE

## 2023-10-24 RX ORDER — IRBESARTAN 150 MG/1
150 TABLET ORAL DAILY
Qty: 90 TABLET | Refills: 3 | Status: SHIPPED | OUTPATIENT
Start: 2023-10-24 | End: 2024-10-23

## 2023-10-24 RX ORDER — AMLODIPINE BESYLATE 10 MG/1
10 TABLET ORAL DAILY
Qty: 90 TABLET | Refills: 3 | Status: SHIPPED | OUTPATIENT
Start: 2023-10-24 | End: 2024-10-23

## 2023-10-24 RX ORDER — MELOXICAM 7.5 MG/1
7.5 TABLET ORAL
Qty: 30 TABLET | Refills: 2 | Status: SHIPPED | OUTPATIENT
Start: 2023-10-24 | End: 2024-01-22

## 2023-10-24 RX ORDER — FUROSEMIDE 20 MG/1
20 TABLET ORAL 2 TIMES DAILY
COMMUNITY

## 2023-10-24 NOTE — PROGRESS NOTES
Subjective:       Patient ID: Carlos Rodas is a 83 y.o. male.    Chief Complaint: Follow-up (Patient here for lab result ) and Shortness of Breath (With exertion)      Patient is established already .......... presents today for a f/u visit , to discuss labs results and for management of the chronic conditions gilberto dyspnea on exertion        Follow-up  Pertinent negatives include no chest pain or fever.   Shortness of Breath  This is a chronic problem. The current episode started more than 1 year ago. The problem occurs intermittently. The problem has been unchanged. Pertinent negatives include no chest pain or fever. The symptoms are aggravated by exercise. The patient has no known risk factors for DVT/PE. Treatments tried: Diuretics. The treatment provided no relief. His past medical history is significant for CAD and a heart failure.     Review of Systems   Constitutional:  Negative for activity change, appetite change and fever.   Respiratory:  Positive for shortness of breath.    Cardiovascular: Negative.  Negative for chest pain.   Gastrointestinal: Negative.    Musculoskeletal: Negative.          Objective:      Physical Exam  Vitals and nursing note reviewed.   Constitutional:       Appearance: Normal appearance. He is obese.   Cardiovascular:      Rate and Rhythm: Normal rate and regular rhythm.      Pulses: Normal pulses.      Heart sounds: Normal heart sounds. No murmur heard.     No friction rub. No gallop.   Pulmonary:      Effort: Pulmonary effort is normal.      Breath sounds: Normal breath sounds. No rhonchi or rales.   Musculoskeletal:      Right lower leg: No edema.      Left lower leg: No edema.   Skin:     General: Skin is warm and dry.   Neurological:      Mental Status: He is alert.   Psychiatric:         Mood and Affect: Mood normal.         Assessment:       1. Need for prophylactic vaccination against Streptococcus pneumoniae (pneumococcus) and influenza  -     Influenza (FLUAD) -  Quadrivalent (Adjuvanted) *Preferred* (65+) (PF)  -     BNP; Future; Expected date: 10/24/2023    2. Congestive heart failure, unspecified HF chronicity, unspecified heart failure type  -     Basic Metabolic Panel; Future; Expected date: 10/24/2023    3. Essential hypertension, benign  Overview:  Blood pressure is controlled today at 108/54 manually on the left side    Orders:  -     Basic Metabolic Panel; Future; Expected date: 10/24/2023    4. Dyspnea on exertion  Overview:  Cardiovascular risk analysis - 83 y.o. male hypertension  hyperlipidemia.   ROS: taking medications as instructed, no medication side effects noted, no TIA's, no chest pain on exertion, no dyspnea on exertion and no swelling of ankles.   New concerns: exertional dyspea.   Exam: BP noted to be well controlled today in office, S1, S2 normal, no gallop, no murmur, chest clear, no JVD, no HSM, no edema, CVS exam  - normal rate, regular rhythm, normal S1, S2, no murmurs, rubs, clicks or gallops. Lab review: orders written for new lab studies as appropriate; see orders.   Assessment:  Hyperlipidemia poorly controlled.   Plan: EKG  CXR  Refer to Card for echo, STT  .      Assessment & Plan:  He's now on lasix 20mg daily per his Card  F/u with Dr Brianne cary for SOB  T/c repeat STT, echo  Check BNP in 1M  Monitor renal fx      5. Chronic kidney disease (CKD) stage G3a/A1, moderately decreased glomerular filtration rate (GFR) between 45-59 mL/min/1.73 square meter and albuminuria creatinine ratio less than 30 mg/g  Overview:  Now on daily lasix    Assessment & Plan:  Monitor renal fx  Dec use of mobic to prn only  Recheck BMP in 1M      6. Preventative health care  Overview:  Patient presents for a wellness visit  No new c/o today  Please refer to initial intake notes for screening/preventive measures  All histories and immunization schedule reviewed with patient        Assessment & Plan:  Fluad given today  Get new COVID at [facility]  pharmacy      Other orders  -     amLODIPine (NORVASC) 10 MG tablet; Take 1 tablet (10 mg total) by mouth once daily.  Dispense: 90 tablet; Refill: 3  -     irbesartan (AVAPRO) 150 MG tablet; Take 1 tablet (150 mg total) by mouth once daily.  Dispense: 90 tablet; Refill: 3  -     meloxicam (MOBIC) 7.5 MG tablet; Take 1 tablet (7.5 mg total) by mouth as needed for Pain.  Dispense: 30 tablet; Refill: 2           Plan:       1. Need for prophylactic vaccination against Streptococcus pneumoniae (pneumococcus) and influenza  -     Influenza (FLUAD) - Quadrivalent (Adjuvanted) *Preferred* (65+) (PF)  -     BNP; Future; Expected date: 10/24/2023    2. Congestive heart failure, unspecified HF chronicity, unspecified heart failure type  -     Basic Metabolic Panel; Future; Expected date: 10/24/2023    3. Essential hypertension, benign  Overview:  Blood pressure is controlled today at 108/54 manually on the left side    Orders:  -     Basic Metabolic Panel; Future; Expected date: 10/24/2023    4. Dyspnea on exertion  Overview:  Cardiovascular risk analysis - 83 y.o. male hypertension  hyperlipidemia.   ROS: taking medications as instructed, no medication side effects noted, no TIA's, no chest pain on exertion, no dyspnea on exertion and no swelling of ankles.   New concerns: exertional dyspea.   Exam: BP noted to be well controlled today in office, S1, S2 normal, no gallop, no murmur, chest clear, no JVD, no HSM, no edema, CVS exam  - normal rate, regular rhythm, normal S1, S2, no murmurs, rubs, clicks or gallops. Lab review: orders written for new lab studies as appropriate; see orders.   Assessment:  Hyperlipidemia poorly controlled.   Plan: EKG  CXR  Refer to Card for echo, STT  .      Assessment & Plan:  He's now on lasix 20mg daily per his Card  F/u with Dr Brianne cary for SOB  T/c repeat STT, echo  Check BNP in 1M  Monitor renal fx      5. Chronic kidney disease (CKD) stage G3a/A1, moderately decreased glomerular  filtration rate (GFR) between 45-59 mL/min/1.73 square meter and albuminuria creatinine ratio less than 30 mg/g  Overview:  Now on daily lasix    Assessment & Plan:  Monitor renal fx  Dec use of mobic to prn only  Recheck BMP in 1M      6. Preventative health care  Overview:  Patient presents for a wellness visit  No new c/o today  Please refer to initial intake notes for screening/preventive measures  All histories and immunization schedule reviewed with patient        Assessment & Plan:  Fluad given today  Get new COVID at [facility] pharmacy      Other orders  -     amLODIPine (NORVASC) 10 MG tablet; Take 1 tablet (10 mg total) by mouth once daily.  Dispense: 90 tablet; Refill: 3  -     irbesartan (AVAPRO) 150 MG tablet; Take 1 tablet (150 mg total) by mouth once daily.  Dispense: 90 tablet; Refill: 3  -     meloxicam (MOBIC) 7.5 MG tablet; Take 1 tablet (7.5 mg total) by mouth as needed for Pain.  Dispense: 30 tablet; Refill: 2

## 2023-11-27 ENCOUNTER — OFFICE VISIT (OUTPATIENT)
Dept: FAMILY MEDICINE | Facility: CLINIC | Age: 83
End: 2023-11-27
Payer: MEDICARE

## 2023-11-27 VITALS
BODY MASS INDEX: 30.07 KG/M2 | WEIGHT: 180.5 LBS | DIASTOLIC BLOOD PRESSURE: 58 MMHG | HEART RATE: 78 BPM | HEIGHT: 65 IN | OXYGEN SATURATION: 95 % | SYSTOLIC BLOOD PRESSURE: 110 MMHG

## 2023-11-27 DIAGNOSIS — N18.32 HYPERTENSIVE KIDNEY DISEASE WITH STAGE 3B CHRONIC KIDNEY DISEASE: Primary | ICD-10-CM

## 2023-11-27 DIAGNOSIS — R73.9 ELEVATED BLOOD SUGAR: ICD-10-CM

## 2023-11-27 DIAGNOSIS — I12.9 HYPERTENSIVE KIDNEY DISEASE WITH STAGE 3B CHRONIC KIDNEY DISEASE: Primary | ICD-10-CM

## 2023-11-27 DIAGNOSIS — J06.9 UPPER RESPIRATORY TRACT INFECTION, UNSPECIFIED TYPE: ICD-10-CM

## 2023-11-27 DIAGNOSIS — E78.2 MIXED HYPERLIPIDEMIA: ICD-10-CM

## 2023-11-27 DIAGNOSIS — I10 ESSENTIAL HYPERTENSION, BENIGN: ICD-10-CM

## 2023-11-27 PROCEDURE — 3078F DIAST BP <80 MM HG: CPT | Mod: CPTII,S$GLB,, | Performed by: INTERNAL MEDICINE

## 2023-11-27 PROCEDURE — 3074F SYST BP LT 130 MM HG: CPT | Mod: CPTII,S$GLB,, | Performed by: INTERNAL MEDICINE

## 2023-11-27 PROCEDURE — 1126F PR PAIN SEVERITY QUANTIFIED, NO PAIN PRESENT: ICD-10-PCS | Mod: CPTII,S$GLB,, | Performed by: INTERNAL MEDICINE

## 2023-11-27 PROCEDURE — 1160F RVW MEDS BY RX/DR IN RCRD: CPT | Mod: CPTII,S$GLB,, | Performed by: INTERNAL MEDICINE

## 2023-11-27 PROCEDURE — 96372 PR INJECTION,THERAP/PROPH/DIAG2ST, IM OR SUBCUT: ICD-10-PCS | Mod: S$GLB,,, | Performed by: INTERNAL MEDICINE

## 2023-11-27 PROCEDURE — 99214 OFFICE O/P EST MOD 30 MIN: CPT | Mod: 25,S$GLB,, | Performed by: INTERNAL MEDICINE

## 2023-11-27 PROCEDURE — 96372 THER/PROPH/DIAG INJ SC/IM: CPT | Mod: S$GLB,,, | Performed by: INTERNAL MEDICINE

## 2023-11-27 PROCEDURE — 1159F PR MEDICATION LIST DOCUMENTED IN MEDICAL RECORD: ICD-10-PCS | Mod: CPTII,S$GLB,, | Performed by: INTERNAL MEDICINE

## 2023-11-27 PROCEDURE — 1126F AMNT PAIN NOTED NONE PRSNT: CPT | Mod: CPTII,S$GLB,, | Performed by: INTERNAL MEDICINE

## 2023-11-27 PROCEDURE — 99214 PR OFFICE/OUTPT VISIT, EST, LEVL IV, 30-39 MIN: ICD-10-PCS | Mod: 25,S$GLB,, | Performed by: INTERNAL MEDICINE

## 2023-11-27 PROCEDURE — 3074F PR MOST RECENT SYSTOLIC BLOOD PRESSURE < 130 MM HG: ICD-10-PCS | Mod: CPTII,S$GLB,, | Performed by: INTERNAL MEDICINE

## 2023-11-27 PROCEDURE — 3078F PR MOST RECENT DIASTOLIC BLOOD PRESSURE < 80 MM HG: ICD-10-PCS | Mod: CPTII,S$GLB,, | Performed by: INTERNAL MEDICINE

## 2023-11-27 PROCEDURE — 1159F MED LIST DOCD IN RCRD: CPT | Mod: CPTII,S$GLB,, | Performed by: INTERNAL MEDICINE

## 2023-11-27 PROCEDURE — 1160F PR REVIEW ALL MEDS BY PRESCRIBER/CLIN PHARMACIST DOCUMENTED: ICD-10-PCS | Mod: CPTII,S$GLB,, | Performed by: INTERNAL MEDICINE

## 2023-11-27 RX ORDER — CEFTRIAXONE 500 MG/1
500 INJECTION, POWDER, FOR SOLUTION INTRAMUSCULAR; INTRAVENOUS
Status: COMPLETED | OUTPATIENT
Start: 2023-11-27 | End: 2023-11-27

## 2023-11-27 RX ORDER — BENZONATATE 100 MG/1
200 CAPSULE ORAL 3 TIMES DAILY PRN
Qty: 30 CAPSULE | Refills: 0 | Status: SHIPPED | OUTPATIENT
Start: 2023-11-27 | End: 2023-12-07

## 2023-11-27 RX ADMIN — CEFTRIAXONE 500 MG: 500 INJECTION, POWDER, FOR SOLUTION INTRAMUSCULAR; INTRAVENOUS at 11:11

## 2023-11-27 NOTE — PROGRESS NOTES
Subjective:       Patient ID: Carlos Rodas is a 83 y.o. male.    Chief Complaint: Follow-up, Sore Throat, and Sinusitis      Patient is established already .......... presents today for a f/u visit , to discuss an URI like illness      Follow-up  Associated symptoms include congestion, coughing and a sore throat. Pertinent negatives include no fever.   Sore Throat   Associated symptoms include congestion and coughing.   Sinusitis  Associated symptoms include congestion, coughing and a sore throat.   URI   This is a new problem. The current episode started in the past 7 days. The problem has been unchanged. There has been no fever. Associated symptoms include congestion, coughing and a sore throat. He has tried NSAIDs for the symptoms. The treatment provided mild relief.     Review of Systems   Constitutional:  Negative for activity change, appetite change and fever.   HENT:  Positive for nasal congestion and sore throat.    Respiratory:  Positive for cough.    Cardiovascular: Negative.    Gastrointestinal: Negative.    Musculoskeletal: Negative.          Objective:      Physical Exam  Vitals and nursing note reviewed.   Constitutional:       Appearance: Normal appearance.   Cardiovascular:      Rate and Rhythm: Normal rate and regular rhythm.      Pulses: Normal pulses.      Heart sounds: Normal heart sounds. No murmur heard.     No friction rub. No gallop.   Pulmonary:      Effort: Pulmonary effort is normal.      Breath sounds: Normal breath sounds. No wheezing.   Skin:     General: Skin is warm and dry.   Neurological:      Mental Status: He is alert.   Psychiatric:         Mood and Affect: Mood normal.         Assessment:       1. Hypertensive kidney disease with stage 3b chronic kidney disease  Overview:  Stable    Assessment & Plan:  I discussed renal condition with patient at length  We discussed diet modification specifically decreasing salt intake, avoiding fast food ,avoiding fried food  We also discussed  the importance of minimizing the use of anti-inflammatory agents like Motrin ibuprofen naproxen Aleve etc.  We discussed the importance of increasing fluid intake specifically water and also stay hydrated  We discussed minimizing the use of diuretics as much as clinically possible  We are going to monitor renal functions BUN creatinine urine microalbumin and electrolytes closely  To consider referral to Nephrology Service if renal fx continue to decline  To consider adding an SGL- 2 inhibitor if renal functions continue to decline, regardless of the blood sugar status        2. Upper respiratory tract infection, unspecified type  Overview:  Upper Respiratory Infection: Patient complains of symptoms of a URI. Symptoms include congestion and cough. Onset of symptoms was 3 days ago, gradually improving since that time. He also c/o cough described as nonproductive for the past 2 days .  He is drinking plenty of fluids. Evaluation to date: seen previously and thought to have a viral URI. Treatment to date: cough suppressants.          Assessment & Plan:  Started 3 days  ago  Dry cough  No f/c  + sore throat  Nose : clogged up             Plan:       1. Hypertensive kidney disease with stage 3b chronic kidney disease  Overview:  Stable    Assessment & Plan:  I discussed renal condition with patient at length  We discussed diet modification specifically decreasing salt intake, avoiding fast food ,avoiding fried food  We also discussed the importance of minimizing the use of anti-inflammatory agents like Motrin ibuprofen naproxen Aleve etc.  We discussed the importance of increasing fluid intake specifically water and also stay hydrated  We discussed minimizing the use of diuretics as much as clinically possible  We are going to monitor renal functions BUN creatinine urine microalbumin and electrolytes closely  To consider referral to Nephrology Service if renal fx continue to decline  To consider adding an SGL- 2 inhibitor  if renal functions continue to decline, regardless of the blood sugar status        2. Upper respiratory tract infection, unspecified type  Overview:  Upper Respiratory Infection: Patient complains of symptoms of a URI. Symptoms include congestion and cough. Onset of symptoms was 3 days ago, gradually improving since that time. He also c/o cough described as nonproductive for the past 2 days .  He is drinking plenty of fluids. Evaluation to date: seen previously and thought to have a viral URI. Treatment to date: cough suppressants.          Assessment & Plan:  Started 3 days  ago  Dry cough  No f/c  + sore throat  Nose : clogged up

## 2023-12-11 ENCOUNTER — TELEPHONE (OUTPATIENT)
Dept: FAMILY MEDICINE | Facility: CLINIC | Age: 83
End: 2023-12-11
Payer: MEDICARE

## 2023-12-11 NOTE — TELEPHONE ENCOUNTER
----- Message from Ayde Francis sent at 12/11/2023 11:48 AM CST -----  Regarding: Diability Parking Application  Pt dropped off disability parking application put in Dr. Somers box please call pt when signed thank you.

## 2024-01-29 PROBLEM — Z00.00 PREVENTATIVE HEALTH CARE: Status: RESOLVED | Noted: 2023-07-24 | Resolved: 2024-01-29

## 2024-02-07 ENCOUNTER — TELEPHONE (OUTPATIENT)
Dept: FAMILY MEDICINE | Facility: CLINIC | Age: 84
End: 2024-02-07
Payer: MEDICARE

## 2024-02-07 NOTE — TELEPHONE ENCOUNTER
----- Message from Janie Chawla sent at 2/7/2024 10:55 AM CST -----  Type: Needs Medical Advice  Who Called:  pt  Symptoms (please be specific):  pt said he need to know if his orders for blood work can get faxed to [facility]-- [place] so he does not have to come from [facility] said the number to the office is [phone] said he do not know if that's the fax or the phone number but that's what they gave him--please  call and advise    Best Call Back Number: [phone] (home) [phone] (work)    Additional Information: thank you

## 2024-02-15 ENCOUNTER — TELEPHONE (OUTPATIENT)
Dept: FAMILY MEDICINE | Facility: CLINIC | Age: 84
End: 2024-02-15
Payer: MEDICARE

## 2024-02-15 NOTE — TELEPHONE ENCOUNTER
----- Message from Kathe Clancy sent at 2/15/2024  1:46 PM CST -----  Contact: PT  Type:  Needs Medical Advice    Who Called: PT  Would the patient rather a call back or a response via [facility]? CALL   Best Call Back Number: [phone] (home) [phone] (work)  Additional Information:   [facility] DIDN'T RECEIVE LAB ORDERS. PLEASE RE FAX -[phone]  THANK YOU

## 2024-02-27 ENCOUNTER — OFFICE VISIT (OUTPATIENT)
Dept: FAMILY MEDICINE | Facility: CLINIC | Age: 84
End: 2024-02-27
Payer: MEDICARE

## 2024-02-27 VITALS
WEIGHT: 177.31 LBS | SYSTOLIC BLOOD PRESSURE: 122 MMHG | HEART RATE: 88 BPM | DIASTOLIC BLOOD PRESSURE: 54 MMHG | HEIGHT: 65 IN | OXYGEN SATURATION: 98 % | BODY MASS INDEX: 29.54 KG/M2

## 2024-02-27 DIAGNOSIS — H35.3212 EXUDATIVE AGE-RELATED MACULAR DEGENERATION, RIGHT EYE, WITH INACTIVE CHOROIDAL NEOVASCULARIZATION: ICD-10-CM

## 2024-02-27 DIAGNOSIS — R06.09 DYSPNEA ON EXERTION: ICD-10-CM

## 2024-02-27 DIAGNOSIS — Z00.00 PREVENTATIVE HEALTH CARE: ICD-10-CM

## 2024-02-27 DIAGNOSIS — J44.9 STAGE 2 MODERATE COPD BY GOLD CLASSIFICATION: ICD-10-CM

## 2024-02-27 DIAGNOSIS — N18.32 HYPERTENSIVE KIDNEY DISEASE WITH STAGE 3B CHRONIC KIDNEY DISEASE: ICD-10-CM

## 2024-02-27 DIAGNOSIS — I12.9 HYPERTENSIVE KIDNEY DISEASE WITH STAGE 3B CHRONIC KIDNEY DISEASE: ICD-10-CM

## 2024-02-27 DIAGNOSIS — I73.9 PERIPHERAL VASCULAR DISEASE, UNSPECIFIED: ICD-10-CM

## 2024-02-27 DIAGNOSIS — M45.9 ANKYLOSING SPONDYLITIS, UNSPECIFIED SITE OF SPINE: ICD-10-CM

## 2024-02-27 DIAGNOSIS — I27.20 PULMONARY HYPERTENSION: ICD-10-CM

## 2024-02-27 DIAGNOSIS — R73.9 BLOOD GLUCOSE ELEVATED: Primary | ICD-10-CM

## 2024-02-27 DIAGNOSIS — D45 POLYCYTHEMIA VERA: ICD-10-CM

## 2024-02-27 PROCEDURE — 1126F AMNT PAIN NOTED NONE PRSNT: CPT | Mod: CPTII,S$GLB,, | Performed by: INTERNAL MEDICINE

## 2024-02-27 PROCEDURE — 3078F DIAST BP <80 MM HG: CPT | Mod: CPTII,S$GLB,, | Performed by: INTERNAL MEDICINE

## 2024-02-27 PROCEDURE — 1160F RVW MEDS BY RX/DR IN RCRD: CPT | Mod: CPTII,S$GLB,, | Performed by: INTERNAL MEDICINE

## 2024-02-27 PROCEDURE — 3074F SYST BP LT 130 MM HG: CPT | Mod: CPTII,S$GLB,, | Performed by: INTERNAL MEDICINE

## 2024-02-27 PROCEDURE — 1159F MED LIST DOCD IN RCRD: CPT | Mod: CPTII,S$GLB,, | Performed by: INTERNAL MEDICINE

## 2024-02-27 PROCEDURE — 99213 OFFICE O/P EST LOW 20 MIN: CPT | Mod: S$GLB,,, | Performed by: INTERNAL MEDICINE

## 2024-02-27 NOTE — PROGRESS NOTES
Subjective:       Patient ID: Carlos Rodas is a 84 y.o. male.    Chief Complaint: Diabetes      Patient is established already .......... presents today for a f/u visit , to discuss labs results and for management of the chronic conditions.        Diabetes  He presents for his follow-up diabetic visit. He has type 2 diabetes mellitus. No MedicAlert identification noted. His disease course has been improving. There are no hypoglycemic associated symptoms. There are no diabetic associated symptoms. There are no hypoglycemic complications.     Review of Systems   Constitutional:  Negative for activity change, appetite change and fever.   Respiratory: Negative.     Cardiovascular: Negative.    Gastrointestinal: Negative.    Musculoskeletal: Negative.          Objective:      Physical Exam  Vitals and nursing note reviewed.   Constitutional:       Appearance: Normal appearance.   Cardiovascular:      Rate and Rhythm: Normal rate and regular rhythm.      Pulses: Normal pulses.      Heart sounds: Normal heart sounds. No murmur heard.     No friction rub. No gallop.   Pulmonary:      Effort: Pulmonary effort is normal.      Breath sounds: Normal breath sounds. No wheezing.   Skin:     General: Skin is warm and dry.   Neurological:      Mental Status: He is alert.   Psychiatric:         Mood and Affect: Mood normal.         Assessment:       1. Blood glucose elevated  Overview:  Elevated BS, A1c    Assessment & Plan:  Diet , wt loss , exercise d/w patient  Recheck BS, A1c      2. Dyspnea on exertion  Overview:  Cardiovascular risk analysis - 83 y.o. male hypertension  hyperlipidemia.   ROS: taking medications as instructed, no medication side effects noted, no TIA's, no chest pain on exertion, no dyspnea on exertion and no swelling of ankles.   New concerns: exertional dyspea.   Exam: BP noted to be well controlled today in office, S1, S2 normal, no gallop, no murmur, chest clear, no JVD, no HSM, no edema, CVS exam  -  normal rate, regular rhythm, normal S1, S2, no murmurs, rubs, clicks or gallops. Lab review: orders written for new lab studies as appropriate; see orders.   Assessment:  Hyperlipidemia poorly controlled.   Plan: EKG  CXR  Refer to Card for echo, STT  .      Assessment & Plan:  He sees Dr Moses for CAD and Filippo Shay in [place]      3. Stage 2 moderate COPD by GOLD classification    4. Polycythemia vera    5. Pulmonary hypertension    6. Exudative age-related macular degeneration, right eye, with inactive choroidal neovascularization    7. Peripheral vascular disease, unspecified    8. Ankylosing spondylitis, unspecified site of spine    9. Hypertensive kidney disease with stage 3b chronic kidney disease  Overview:  Stable      10. Preventative health care  Overview:  See below    Assessment & Plan:  Get COVID , RSV             Plan:       1. Blood glucose elevated  Overview:  Elevated BS, A1c    Assessment & Plan:  Diet , wt loss , exercise d/w patient  Recheck BS, A1c      2. Dyspnea on exertion  Overview:  Cardiovascular risk analysis - 83 y.o. male hypertension  hyperlipidemia.   ROS: taking medications as instructed, no medication side effects noted, no TIA's, no chest pain on exertion, no dyspnea on exertion and no swelling of ankles.   New concerns: exertional dyspea.   Exam: BP noted to be well controlled today in office, S1, S2 normal, no gallop, no murmur, chest clear, no JVD, no HSM, no edema, CVS exam  - normal rate, regular rhythm, normal S1, S2, no murmurs, rubs, clicks or gallops. Lab review: orders written for new lab studies as appropriate; see orders.   Assessment:  Hyperlipidemia poorly controlled.   Plan: EKG  CXR  Refer to Card for echo, STT  .      Assessment & Plan:  He sees Dr Moses for CAD and Filippo Shay in [place]      3. Stage 2 moderate COPD by GOLD classification    4. Polycythemia vera    5. Pulmonary hypertension    6. Exudative age-related macular degeneration, right eye,  with inactive choroidal neovascularization    7. Peripheral vascular disease, unspecified    8. Ankylosing spondylitis, unspecified site of spine    9. Hypertensive kidney disease with stage 3b chronic kidney disease  Overview:  Stable      10. Preventative health care  Overview:  See below    Assessment & Plan:  Get COVID , RSV

## 2024-05-20 ENCOUNTER — TELEPHONE (OUTPATIENT)
Dept: FAMILY MEDICINE | Facility: CLINIC | Age: 84
End: 2024-05-20
Payer: MEDICARE

## 2024-05-20 NOTE — TELEPHONE ENCOUNTER
----- Message from Kathe Clancy sent at 5/20/2024 11:24 AM CDT -----  Contact: PT  CORRECT FAX # [phone] EUGENE ROWE FAX   [phone] (home) [phone] (work)  THANK YOU

## 2024-06-03 PROBLEM — Z00.00 PREVENTATIVE HEALTH CARE: Status: RESOLVED | Noted: 2024-02-27 | Resolved: 2024-06-03

## 2024-06-04 ENCOUNTER — LAB VISIT (OUTPATIENT)
Dept: LAB | Facility: CLINIC | Age: 84
End: 2024-06-04
Payer: MEDICARE

## 2024-06-04 ENCOUNTER — OFFICE VISIT (OUTPATIENT)
Dept: FAMILY MEDICINE | Facility: CLINIC | Age: 84
End: 2024-06-04
Payer: MEDICARE

## 2024-06-04 VITALS
DIASTOLIC BLOOD PRESSURE: 54 MMHG | SYSTOLIC BLOOD PRESSURE: 112 MMHG | BODY MASS INDEX: 29.97 KG/M2 | OXYGEN SATURATION: 94 % | HEIGHT: 65 IN | WEIGHT: 179.88 LBS | HEART RATE: 62 BPM

## 2024-06-04 DIAGNOSIS — N18.32 HYPERTENSIVE KIDNEY DISEASE WITH STAGE 3B CHRONIC KIDNEY DISEASE: ICD-10-CM

## 2024-06-04 DIAGNOSIS — I10 ESSENTIAL HYPERTENSION, BENIGN: ICD-10-CM

## 2024-06-04 DIAGNOSIS — R73.9 BLOOD GLUCOSE ELEVATED: ICD-10-CM

## 2024-06-04 DIAGNOSIS — E78.2 MIXED HYPERLIPIDEMIA: Primary | ICD-10-CM

## 2024-06-04 DIAGNOSIS — E78.2 MIXED HYPERLIPIDEMIA: ICD-10-CM

## 2024-06-04 DIAGNOSIS — R73.9 ELEVATED BLOOD SUGAR: ICD-10-CM

## 2024-06-04 DIAGNOSIS — Z00.00 ENCOUNTER FOR ANNUAL WELLNESS VISIT (AWV) IN MEDICARE PATIENT: Primary | ICD-10-CM

## 2024-06-04 DIAGNOSIS — N18.31 CHRONIC KIDNEY DISEASE (CKD) STAGE G3A/A1, MODERATELY DECREASED GLOMERULAR FILTRATION RATE (GFR) BETWEEN 45-59 ML/MIN/1.73 SQUARE METER AND ALBUMINURIA CREATININE RATIO LESS THAN 30 MG/G: ICD-10-CM

## 2024-06-04 DIAGNOSIS — I12.9 HYPERTENSIVE KIDNEY DISEASE WITH STAGE 3B CHRONIC KIDNEY DISEASE: ICD-10-CM

## 2024-06-04 LAB
ESTIMATED AVG GLUCOSE: 128 MG/DL (ref 68–131)
HBA1C MFR BLD: 6.1 % (ref 4–5.6)

## 2024-06-04 PROCEDURE — 1159F MED LIST DOCD IN RCRD: CPT | Mod: CPTII,S$GLB,, | Performed by: INTERNAL MEDICINE

## 2024-06-04 PROCEDURE — 3078F DIAST BP <80 MM HG: CPT | Mod: CPTII,S$GLB,, | Performed by: INTERNAL MEDICINE

## 2024-06-04 PROCEDURE — 1100F PTFALLS ASSESS-DOCD GE2>/YR: CPT | Mod: CPTII,S$GLB,, | Performed by: INTERNAL MEDICINE

## 2024-06-04 PROCEDURE — 3074F SYST BP LT 130 MM HG: CPT | Mod: CPTII,S$GLB,, | Performed by: INTERNAL MEDICINE

## 2024-06-04 PROCEDURE — 36415 COLL VENOUS BLD VENIPUNCTURE: CPT | Mod: ,,, | Performed by: INTERNAL MEDICINE

## 2024-06-04 PROCEDURE — G0439 PPPS, SUBSEQ VISIT: HCPCS | Mod: S$GLB,,, | Performed by: INTERNAL MEDICINE

## 2024-06-04 PROCEDURE — 83036 HEMOGLOBIN GLYCOSYLATED A1C: CPT | Performed by: INTERNAL MEDICINE

## 2024-06-04 PROCEDURE — 99212 OFFICE O/P EST SF 10 MIN: CPT | Mod: 25,S$GLB,, | Performed by: INTERNAL MEDICINE

## 2024-06-04 PROCEDURE — 1160F RVW MEDS BY RX/DR IN RCRD: CPT | Mod: CPTII,S$GLB,, | Performed by: INTERNAL MEDICINE

## 2024-06-04 PROCEDURE — 3288F FALL RISK ASSESSMENT DOCD: CPT | Mod: CPTII,S$GLB,, | Performed by: INTERNAL MEDICINE

## 2024-06-04 PROCEDURE — 99497 ADVNCD CARE PLAN 30 MIN: CPT | Mod: S$GLB,,, | Performed by: INTERNAL MEDICINE

## 2024-06-04 PROCEDURE — 1158F ADVNC CARE PLAN TLK DOCD: CPT | Mod: CPTII,S$GLB,, | Performed by: INTERNAL MEDICINE

## 2024-06-04 RX ORDER — MELOXICAM 15 MG/1
15 TABLET ORAL
COMMUNITY

## 2024-06-04 NOTE — ASSESSMENT & PLAN NOTE
Get RSV  & COVID vaccines  Diet , wt loss , exercise d/w patient  I gave him a copy of AMD  We discussed tobacco, ETOH & physical activity  Dep screen : neg

## 2024-06-04 NOTE — PROGRESS NOTES
Subjective:       Patient ID: Carlos Rodas is a 84 y.o. male.    Chief Complaint: Annual Exam and Hypertension      Annual Medicare wellness visit :  Reported by patient.  Diet and nutrition:  Healthy diet  Fracture risk:  No history of fractures; no recent explain infection; no sudden unexplained fractures; previous musculoskeletal injuries  Physical activity:  Exercise on a regular basis; recent increase in physical activity; good  Physical condition  Depression risk:  Never feels sad, empty or tearful; no loss of interest in activities; no significant changes in weight; no sleep disturbances or insomnia; no agitation; no loss of energy; no feelings of worthlessness or guilt; no thoughts of suicide; no history of depression; no history of mood disorders  Orientation:  No disorientation to time; no disorientation to date; no disorientation to place  Concentration and memory:  No decreased concentration ability; no memory lapses or loss; does not forget words  Speech/motor difficulties:  No speech difficulties; no difficulty expressing formulated concepts; no difficulty with fine manipulative tasks; no difficulty writing/coping; no slowed reaction time/; does not knock things over with trying to pick them up  Hearing: No loss of hearing  Vision:  No vision problems  Activities of daily living:  Able to bathe with limited or no assistance; able to control urination and bowels; able to dress with limited or no assistance; able to feed herself with limited or no assistance; able to get out of chair or bed with limited or no assistance; able to Groom with limited or no assistance; able to toilet with limited or no assistance  Instrumental activities of daily living: Able to do housework with limited or no assistance; able to grocery shop with limited or no assistance; able to manage medications with limited or no assistance; able to manage money with limited or no assistance; able to prepare meals with limited or no  assistance; able to use the phone with limited or no assistance  Falls risk assessment: No frequent falls while walking; no 4 in the past year; no falls since last visit; no dizziness/vertigo  Home safety:  No unsafe libby hazards; no unsafe stairs; no unsafe gas appliances; Working smoke/CO detectors; wears protective head gear for biking/high velocity; use of seatbelts; practicing 'safer sex'; no vision or hearing loss while driving; no firearms; has hand bars in the bathroom/shower; good lighting in the home      Hypertension  This is a chronic problem. The current episode started more than 1 year ago. The problem is unchanged. The problem is controlled. There are no associated agents to hypertension. Risk factors for coronary artery disease include dyslipidemia, sedentary lifestyle and family history. Past treatments include angiotensin blockers, calcium channel blockers and diuretics. The current treatment provides significant improvement. Compliance problems include exercise.  Hypertensive end-organ damage includes kidney disease. Identifiable causes of hypertension include chronic renal disease.     Review of Systems Review of system:  Patient reports no dry skin, no itching, no abnormal moles, no jaundice, no rashes, no hives, no discoloration, no excessive facial or body hair between bracket hirsutism, no hair thinning, no growth/lesions, and no excessive sweating; patient reports no fever, no chills, no night sweats, no significant weight gain, no significant weight loss, no exercise intolerance  Patient reports normal appetite and no sleep disturbances (insomnia)  Patient reports no dry eyes, no irritation, no pain in the eyes, no visual changes, no floaters, no sensitivity to light between bracket photophobia, no seeing double between bracket diplopia, and  No discharge.  Patient reports no difficulty hearing, no ear pain, no vertigo and no ringing in the ears between bracket tinnitus.  Patient  reports no difficulty smelling, no frequent nosebleeds, and no nose/sinus  Problems.  Patient reports no dry mouth, no unusual taste of foods, no mouth ulcers, no bleeding gums, and oral abnormalities and no teeth problem  Patient reports no sore throat, no difficulty swallowing between bracket dysphagia, no anterior neck pain/tenderness, no snoring no change in voice.  Patient reports no chest pain, no arm pain on exertion, no shortness of breath when walking, no shortness of breath when lying down, no palpitations, no known heart murmur, no lightheadedness, no calf or jaw pains, and no ankle edema.  Patient reports no cough, no nasal congestion, no runny nose, no sinus pressure, no wheezing, no frequent sneezing, no shortness of breath, no rapid breathing, no sputum production and no coughing up blood  Patient reports no nausea, no vomiting, no vomiting blood, no abdominal pain, normal appetite, no diarrhea, no constipation, no dyspepsia/reflux, no heartburn, no early satiety, complete emptying of stool cup, no bloating, able to restrain bowel movements, no bowel urgency and no blood in stools/on toilet paper.  Patient reports no pain during urination, no incontinence, no difficulty urinating, no hematuria, no increased frequency, no feelings of urgency, no flank pains and no urinary tract infections  Patient reports no muscle aches, no muscle weakness, no muscle cramps, no arthralgias/joint pain, no back pains, no swelling in the extremities and no difficulty walking.  Patient reports in dependency.  Patient reports no loss of consciousness, no slurred speech, no weakness, no numbness, no tingling, no tremors, no seizures, no dizziness, no headaches, no memory lapses or changes, no difficulty finding desired words, no loss of balance or falls  Patient reports no irritability, no depression, no anxiety, no panic attacks, no sleep disturbances, feeling safe in her relationship, no paranoia and no thoughts about  suicide  Patient reports no fatigue, no cold intolerance, no heat intolerance and no unusual body odor  Patient reports no bruising, no swollen glands, no clotting problems, and no bleeding disorders    Review for Opioid Screening: Pt does not have Rx for Opioids (If patient has Rx list here)      Review for Substance Use Disorders: Patient does not use substance (If patient has Rx list here)         Objective:      Physical Exam  Physical Exam:  Patient is a  year old   Constitutional:  General appearance:  Healthy appearing, well nourished, well developed, and well groomed.  Level of distress:  NAD.  Ambulation:  Ambulating normally.  Psychiatric:  Insight:  Good judgment.  Mental status:  Normal mood and affect an active and alert.  Orientation:  To time, place and person.  Memory:  Recent memory  Normal and remote memory normal.  Head:  Normocephalic, atraumatic, symmetrical, no tenderness, and hair is evenly distributed.  Eyes:  Lids and conjunctivae:  No discharge, pallor or redness and noninjected.  Pupils:  Kept it PERRLA.  Corneas: Grossly intact and fluorescein stain normal.  Funduscopic examination:  Normal vessels and optic discs, no exudates or hemorrhages and grossly normal except where noted.  Capital E OM: Intact.  Lungs: Clear.  Sclera: Nonicteric.  Vision: Peripheral vision grossly intact in acuity grossly intact.  Optic disc:  Normal appearance and vessels and no exudates or hemorrhages  ENMT:  Ears:  No lesions on external ear, EACs clear.  TMs clear.  TM mobility normal and normal general appearance.  Hearing:  No hearing loss and Rinne: AC > BC.  Nose:  No polyps, lesions on external nose, septal deviation, sinus tenderness, or nasal discharge; nasal passages clear mucosa pink; and nares patent.  Lips teeth and gums: No mouth or lip ulcers or bleeding.  Gums are normal dentition normal.  Oropharynx:  No erythema exudates or ulcers and moist mucous membranes.  Tonsils not enlarged.  Oral mucosa  and salivary glands normal  Neck:  Neck is supple, symmetrical, trachea midline and no masses.  Lymph nodes:  No cervical lymphadenopathy.  Thyroid no enlargement or nodules and nontender  Lungs:  Respiratory effort no dyspnea.  Percussion: No dullness, flatness or hyper-resonance.  Auscultation:  No wheezing, rales/crackles, no rhonchi and breath sounds normal, good air movement, and clear to auscultation except as noted.  Chest deformity: normal thoracic no deformities  Cardiovascular:  Apical pulse: Nondisplaced.  Heart auscultation:  Normal S1 and S2; no murmurs rubs or gallops; and RRR.  Neck vessels: No carotid bruit on the right or left and no JVDs or hepatojugular reflux.  Arterial pulses normal on the right and left radial femoral and dorsalis pedis and posterior tibial.  Varicosities right and left non-existent and capillary refill test normal.  Edema none: on the right or left  Breast: not applicable  Abdomen:  Bowel sounds normal.  Inspection and palpation:  No tenderness guarding masses rebound tenderness or CVA tenderness and soft and nondistended.  Liver column nontender and no hepatomegaly.  Spleen:  Nontender and no splenomegaly.  Hernia: Nonpalpable  Male :  Deferred  Rectal:  No hemorrhoids fissures masses and normal tone and stool heme-negative  Musculoskeletal:  Motor strength and tone:  Normal and normal tone.  Joints bones and muscles:  No contractures malalignment, tenderness or bony abnormalities and normal movement of all extremities and posture is normal.  Extremities:  No cyanosis clubbing or palpable cords  Neurological examination: Gait and station:  Normal gait and station.  Cranial nerves:  Grossly intact.  Sensation:  Monofilament test intact and normal and grossly intact.  Reflexes:  DTRs 2+ bilaterally throughout.  Coordination and cerebellum: no intention tremors, no resting tremors. Romberg's sign: negative. Motor strength normal on the right and left.  Sensation normal on the  right and left side.  No pain/temperature decrease on the legs and dorsum of the feet.  No tactile decreased on the leg and dorsum of the feet.  No vibration- perception threshold decrease  Skin:  Inspection and palpation: No rash, lesions, ulcers, nodules, jaundice or abnormal nevi and good turgor.  Nails:  Normal.  Right and left lower extremities column normal  Back: Thoracolumbar appearance: Normal curvature  Foot examination:  Right and left feet were examined, and toes were examined:  No deformity, inter digital erythema, or nail changes or disorders and digital hair present and normal motion.       I offered to discuss end of life issues, including information on how to make advance directives that the patient could use to name someone who would make medical decisions on their behalf if they became too ill to make themselves.      __Patient declined       _X__Patient is interested, I provided paperwork and offered to discuss       Assessment:       1. Encounter for annual wellness visit (AWV) in Medicare patient  Overview:  AWV    Assessment & Plan:  Get RSV  & COVID vaccines  Diet , wt loss , exercise d/w patient  I gave him a copy of AMD  We discussed tobacco, ETOH & physical activity  Dep screen : neg        2. Essential hypertension, benign  Overview:  Blood pressure is controlled today at 112/54    Assessment & Plan:  Cont norvasc & avapro             Plan:       1. Encounter for annual wellness visit (AWV) in Medicare patient  Overview:  AWV    Assessment & Plan:  Get RSV  & COVID vaccines  Diet , wt loss , exercise d/w patient  I gave him a copy of AMD  We discussed tobacco, ETOH & physical activity  Dep screen : neg        2. Essential hypertension, benign  Overview:  Blood pressure is controlled today at 112/54    Assessment & Plan:  Cont norvasc & avapro          Advance Care Planning     Date: 06/04/2024    Power of   I initiated the process of voluntary advance care planning today and  explained the importance of this process to the patient.  I introduced the concept of advance directives to the patient, as well. Then the patient received detailed information about the importance of designating a Health Care Power of  (HCPOA). He was also instructed to communicate with this person about their wishes for future healthcare, should he become sick and lose decision-making capacity. The patient has not previously appointed a HCPOA. After our discussion, the patient has decided to complete a HCPOA and has appointed his significant other, health care agent:  Kye   & son : Carlos  :  See demo  I spent a total time of   16 minutes discussing this issue with the patient.

## 2024-06-05 ENCOUNTER — LAB VISIT (OUTPATIENT)
Dept: LAB | Facility: CLINIC | Age: 84
End: 2024-06-05
Payer: MEDICARE

## 2024-06-05 DIAGNOSIS — I10 ESSENTIAL HYPERTENSION, BENIGN: ICD-10-CM

## 2024-06-05 DIAGNOSIS — R73.9 ELEVATED BLOOD SUGAR: ICD-10-CM

## 2024-06-05 DIAGNOSIS — E78.2 MIXED HYPERLIPIDEMIA: ICD-10-CM

## 2024-06-05 LAB
ALBUMIN SERPL BCP-MCNC: 3.6 G/DL (ref 3.5–5.2)
ALP SERPL-CCNC: 107 U/L (ref 55–135)
ALT SERPL W/O P-5'-P-CCNC: 19 U/L (ref 10–44)
ANION GAP SERPL CALC-SCNC: 9 MMOL/L (ref 8–16)
AST SERPL-CCNC: 17 U/L (ref 10–40)
BILIRUB SERPL-MCNC: 1.3 MG/DL (ref 0.1–1)
BUN SERPL-MCNC: 27 MG/DL (ref 8–23)
CALCIUM SERPL-MCNC: 9.3 MG/DL (ref 8.7–10.5)
CHLORIDE SERPL-SCNC: 110 MMOL/L (ref 95–110)
CHOLEST SERPL-MCNC: 147 MG/DL (ref 120–199)
CHOLEST/HDLC SERPL: 5.3 {RATIO} (ref 2–5)
CO2 SERPL-SCNC: 25 MMOL/L (ref 23–29)
CREAT SERPL-MCNC: 1.8 MG/DL (ref 0.5–1.4)
EST. GFR  (NO RACE VARIABLE): 36.4 ML/MIN/1.73 M^2
ESTIMATED AVG GLUCOSE: 126 MG/DL (ref 68–131)
GLUCOSE SERPL-MCNC: 113 MG/DL (ref 70–110)
HBA1C MFR BLD: 6 % (ref 4–5.6)
HDLC SERPL-MCNC: 28 MG/DL (ref 40–75)
HDLC SERPL: 19 % (ref 20–50)
LDLC SERPL CALC-MCNC: 85.2 MG/DL (ref 63–159)
NONHDLC SERPL-MCNC: 119 MG/DL
POTASSIUM SERPL-SCNC: 4 MMOL/L (ref 3.5–5.1)
PROT SERPL-MCNC: 6.4 G/DL (ref 6–8.4)
SODIUM SERPL-SCNC: 144 MMOL/L (ref 136–145)
TRIGL SERPL-MCNC: 169 MG/DL (ref 30–150)

## 2024-06-05 PROCEDURE — 80061 LIPID PANEL: CPT | Performed by: INTERNAL MEDICINE

## 2024-06-05 PROCEDURE — 83036 HEMOGLOBIN GLYCOSYLATED A1C: CPT | Performed by: INTERNAL MEDICINE

## 2024-06-05 PROCEDURE — 80053 COMPREHEN METABOLIC PANEL: CPT | Performed by: INTERNAL MEDICINE

## 2024-08-20 ENCOUNTER — TELEPHONE (OUTPATIENT)
Dept: FAMILY MEDICINE | Facility: CLINIC | Age: 84
End: 2024-08-20
Payer: MEDICARE

## 2024-08-20 DIAGNOSIS — N18.9 CHRONIC KIDNEY DISEASE, UNSPECIFIED CKD STAGE: Primary | ICD-10-CM

## 2024-08-20 NOTE — TELEPHONE ENCOUNTER
Donta Alston,  Please review this message below from this patient. Requesting lab orders be placed prior to upcoming appointment scheduled for 9/4/2024.  Thank you.  Signed:  Jocelyn Luu LPN    ----- Message from Dayan Yeboah, Patient Care Assistant sent at 8/20/2024  9:46 AM CDT -----  Regarding: orders  Contact: pt  Type: Needs Medical Advice    Who Called:  pt     Best Call Back Number: [phone] (home) [phone] (work) fax # [phone]     Additional Information: pt states she would like a callback regarding lab orders sent to [facility]. Please call to advise. Thanks!

## 2024-09-05 ENCOUNTER — OFFICE VISIT (OUTPATIENT)
Dept: FAMILY MEDICINE | Facility: CLINIC | Age: 84
End: 2024-09-05
Payer: MEDICARE

## 2024-09-05 VITALS
WEIGHT: 175 LBS | OXYGEN SATURATION: 97 % | BODY MASS INDEX: 29.16 KG/M2 | HEIGHT: 65 IN | DIASTOLIC BLOOD PRESSURE: 56 MMHG | HEART RATE: 81 BPM | SYSTOLIC BLOOD PRESSURE: 90 MMHG

## 2024-09-05 DIAGNOSIS — G47.00 INSOMNIA, UNSPECIFIED TYPE: ICD-10-CM

## 2024-09-05 DIAGNOSIS — I12.9 HYPERTENSIVE KIDNEY DISEASE WITH STAGE 3B CHRONIC KIDNEY DISEASE: ICD-10-CM

## 2024-09-05 DIAGNOSIS — N18.32 CHRONIC KIDNEY DISEASE (CKD) STAGE G3B/A1, MODERATELY DECREASED GLOMERULAR FILTRATION RATE (GFR) BETWEEN 30-44 ML/MIN/1.73 SQUARE METER AND ALBUMINURIA CREATININE RATIO LESS THAN 30 MG/G: Primary | ICD-10-CM

## 2024-09-05 DIAGNOSIS — N18.32 HYPERTENSIVE KIDNEY DISEASE WITH STAGE 3B CHRONIC KIDNEY DISEASE: ICD-10-CM

## 2024-09-05 DIAGNOSIS — K64.9 HEMORRHOIDS, UNSPECIFIED HEMORRHOID TYPE: ICD-10-CM

## 2024-09-05 DIAGNOSIS — Z23 NEED FOR PROPHYLACTIC VACCINATION AND INOCULATION AGAINST INFLUENZA: ICD-10-CM

## 2024-09-05 RX ORDER — GLYCERIN, LIDOCAINE, PETROLATUM, AND PHENYLEPHRINE HYDROCHLORIDE 144; 50; 150; 2.5 MG/G; MG/G; MG/G; MG/G
1 CREAM TOPICAL 2 TIMES DAILY
Qty: 28 G | Refills: 2 | Status: SHIPPED | OUTPATIENT
Start: 2024-09-05 | End: 2024-10-20

## 2024-09-05 RX ORDER — PREDNISOLONE ACETATE 10 MG/ML
1 SUSPENSION/ DROPS OPHTHALMIC 4 TIMES DAILY
COMMUNITY
Start: 2024-08-17

## 2024-09-05 NOTE — ASSESSMENT & PLAN NOTE
D/c ryan zeng  I discussed renal condition with patient at length  We discussed diet modification specifically decreasing salt intake, avoiding fast food ,avoiding fried food  We also discussed the importance of minimizing the use of anti-inflammatory agents like Motrin ibuprofen naproxen Aleve etc.  We discussed the importance of increasing fluid intake specifically water and also stay hydrated  We discussed minimizing the use of diuretics as much as clinically possible  We are going to monitor renal functions BUN creatinine urine microalbumin and electrolytes closely  To consider referral to Nephrology Service if renal fx continue to decline  To consider adding an SGL- 2 inhibitor if renal functions continue to decline, regardless of the blood sugar status

## 2024-09-05 NOTE — PROGRESS NOTES
Subjective:       Patient ID: Carlos Rodas is a 84 y.o. male.    Chief Complaint: Follow-up (Patient is here for a follow up visit. ) and Chronic Kidney Disease      Patient is established already .......... presents today for a f/u visit , to discuss labs results and for management of the chronic conditions.        Follow-up  This is a chronic problem. The current episode started more than 1 year ago. The problem occurs intermittently. The problem has been gradually worsening. Pertinent negatives include no fever. Associated symptoms comments: Insomnia, hemorrhoids. Nothing aggravates the symptoms. Treatments tried: See MAR. The treatment provided mild relief.     Review of Systems   Constitutional:  Negative for activity change, appetite change and fever.   Respiratory: Negative.     Cardiovascular: Negative.    Gastrointestinal:  Positive for rectal pain.   Musculoskeletal: Negative.          Objective:      Physical Exam  Vitals and nursing note reviewed.   Constitutional:       Appearance: Normal appearance.   Cardiovascular:      Rate and Rhythm: Normal rate and regular rhythm.      Pulses: Normal pulses.      Heart sounds: Normal heart sounds. No murmur heard.     No friction rub. No gallop.   Pulmonary:      Effort: Pulmonary effort is normal.      Breath sounds: Normal breath sounds. No wheezing.   Skin:     General: Skin is warm and dry.   Neurological:      Mental Status: He is alert.   Psychiatric:         Mood and Affect: Mood normal.         Assessment:       1. Chronic kidney disease (CKD) stage G3b/A1, moderately decreased glomerular filtration rate (GFR) between 30-44 mL/min/1.73 square meter and albuminuria creatinine ratio less than 30 mg/g  Overview:  Now on daily lasix    Assessment & Plan:  D/c lasix  Add jardiance  I discussed renal condition with patient at length  We discussed diet modification specifically decreasing salt intake, avoiding fast food ,avoiding fried food  We also discussed  the importance of minimizing the use of anti-inflammatory agents like Motrin ibuprofen naproxen Aleve etc.  We discussed the importance of increasing fluid intake specifically water and also stay hydrated  We discussed minimizing the use of diuretics as much as clinically possible  We are going to monitor renal functions BUN creatinine urine microalbumin and electrolytes closely  To consider referral to Nephrology Service if renal fx continue to decline  To consider adding an SGL- 2 inhibitor if renal functions continue to decline, regardless of the blood sugar status        2. Insomnia, unspecified type  Overview:  Have diff staying asleep    Assessment & Plan:  Replace trazodone with melatonin 8-10 mg      3. Hemorrhoids, unspecified hemorrhoid type  Overview:  Recurrent    Assessment & Plan:  Add Prep H    Orders:  -     LIDOcaine-phenyl-glycer-petrol (PREPARATION H RAPID RLF-LIDOCN) 5-0.25-14.4-15 % Crea; Apply 1 Application topically 2 (two) times a day.  Dispense: 28 g; Refill: 2    4. Hypertensive kidney disease with stage 3b chronic kidney disease  Overview:  Stable    Assessment & Plan:  Monitor  Add jardiance        5. Need for prophylactic vaccination and inoculation against influenza  -     influenza (Fluad) 45 mcg/0.5 mL vaccine 0.5 mL    Other orders  -     empagliflozin (JARDIANCE) 10 mg tablet; Take 1 tablet (10 mg total) by mouth once daily.  Dispense: 30 tablet; Refill: 2           Plan:       1. Chronic kidney disease (CKD) stage G3b/A1, moderately decreased glomerular filtration rate (GFR) between 30-44 mL/min/1.73 square meter and albuminuria creatinine ratio less than 30 mg/g  Overview:  Now on daily lasix    Assessment & Plan:  D/c lasix  Add jardiance  I discussed renal condition with patient at length  We discussed diet modification specifically decreasing salt intake, avoiding fast food ,avoiding fried food  We also discussed the importance of minimizing the use of anti-inflammatory agents  like Motrin ibuprofen naproxen Aleve etc.  We discussed the importance of increasing fluid intake specifically water and also stay hydrated  We discussed minimizing the use of diuretics as much as clinically possible  We are going to monitor renal functions BUN creatinine urine microalbumin and electrolytes closely  To consider referral to Nephrology Service if renal fx continue to decline  To consider adding an SGL- 2 inhibitor if renal functions continue to decline, regardless of the blood sugar status        2. Insomnia, unspecified type  Overview:  Have diff staying asleep    Assessment & Plan:  Replace trazodone with melatonin 8-10 mg      3. Hemorrhoids, unspecified hemorrhoid type  Overview:  Recurrent    Assessment & Plan:  Add Prep H    Orders:  -     LIDOcaine-phenyl-glycer-petrol (PREPARATION H RAPID RLF-LIDOCN) 5-0.25-14.4-15 % Crea; Apply 1 Application topically 2 (two) times a day.  Dispense: 28 g; Refill: 2    4. Hypertensive kidney disease with stage 3b chronic kidney disease  Overview:  Stable    Assessment & Plan:  Monitor  Add jardiance        5. Need for prophylactic vaccination and inoculation against influenza  -     influenza (Fluad) 45 mcg/0.5 mL vaccine 0.5 mL    Other orders  -     empagliflozin (JARDIANCE) 10 mg tablet; Take 1 tablet (10 mg total) by mouth once daily.  Dispense: 30 tablet; Refill: 2

## 2024-09-09 ENCOUNTER — TELEPHONE (OUTPATIENT)
Dept: FAMILY MEDICINE | Facility: CLINIC | Age: 84
End: 2024-09-09
Payer: MEDICARE

## 2024-09-09 PROBLEM — Z00.00 ENCOUNTER FOR ANNUAL WELLNESS VISIT (AWV) IN MEDICARE PATIENT: Status: RESOLVED | Noted: 2024-02-27 | Resolved: 2024-09-09

## 2024-09-09 NOTE — TELEPHONE ENCOUNTER
----- Message from Kathe Aston sent at 9/6/2024  3:07 PM CDT -----  Contact: PT  Type:  Needs Medical Advice    Who Called: PT   Symptoms (please be specific): PLEASE CALL TO DISCUSS WHAT MEDICATIONS SHOULD HAVE BEEN CALLED IN    How long has patient had these symptoms:  N/A  Pharmacy name and phone #:    [facility] - [place], [place] - [address]  [address]  [place] [address]  Phone: [phone] Fax: [phone]  Would the patient rather a call back or a response via [facility]? CALL   Best Call Back Number: [phone] (home) [phone] (work)  Additional Information: THANK YOU

## 2024-09-23 ENCOUNTER — TELEPHONE (OUTPATIENT)
Dept: FAMILY MEDICINE | Facility: CLINIC | Age: 84
End: 2024-09-23
Payer: MEDICARE

## 2024-09-23 NOTE — TELEPHONE ENCOUNTER
Donta Varghese,  [facility] does have the Rx for Jardiance 10 mg and the patient acknowledge that on yesterday when he called asking the reason for this medication.  [facility] states yes the prescription is there but patient insurance [employer] will not pay for the cost of this medication. The cost is almost $700 dollars. Medication is in patient's drug formulary.  Please review.  Thank you.  Signed:  SAMANTHA Howell Magdy, MD Rashid, Carlinda, LPN  Caller: Unspecified (Yesterday,  1:00 PM)  We will discuss those findings next visit  As far as the Jardiance I called the prescription 2 weeks ago or so to [facility]  Did you call the pharmacy to double-check Allyson?  Thx      Donta Varghese and Dr. Varghese's Staff,  Please review this message from this patient concerning PA for the Jardiance 10 mg Rx. It closed after I initiated PA.  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 States never received the Rx for the Jardiance 10 mg. Reviewed chart PA initiated.   Last office visit: 9/5/2024  Thank you.  Signed:  Jocelyn Luu LPN  ----- Message from Netta Holland sent at 9/23/2024 10:31 AM CDT -----  Contact: Patient  Type:  Needs Medical Advice    Who Called:   Patient    Pharmacy name and phone #:        [facility] - [place], [place] - [address]  [id number]  [place] [address]  Phone: [phone] Fax: [phone]    Would the patient rather a call back or a response via [facility]?  Call back  Best Call Back Number:   [phone]    Additional Information:   States he would like to speak with someone about two concerns - states no one has called him back about the empagliflozin (JARDIANCE) 10 mg tablet and he would like to know what that medication is for - states Matrix came to him on 9/17 and says right leg PAD is mild and left leg is  moderate and to follow up with doctor - states he is wondering if Matrix sends a copy of results to doctor's office - please call - thank you

## 2024-11-05 RX ORDER — AMLODIPINE BESYLATE 10 MG/1
10 TABLET ORAL
Qty: 90 TABLET | Refills: 3 | Status: SHIPPED | OUTPATIENT
Start: 2024-11-05

## 2024-11-05 NOTE — TELEPHONE ENCOUNTER
Refill Decision Note   Carlos Rodas  is requesting a refill authorization.  Brief Assessment and Rationale for Refill:  Approve     Medication Therapy Plan:        Comments:     Note composed:8:15 AM 11/05/2024

## 2024-11-05 NOTE — TELEPHONE ENCOUNTER
Care Due:                  Date            Visit Type   Department     Provider  --------------------------------------------------------------------------------                                ESTABLISHED                              PATIENT      [facility] FAMILY  Last Visit: 09-      [facility]        SHALA Bustamante                               -                              PRIMARY      [facility] FAM[facility]  Next Visit: 12-      CARE ([facility])   [facility]       Bebeto Bustamante                                                            Last  Test          Frequency    Reason                     Performed    Due Date  --------------------------------------------------------------------------------    HBA1C.......  6 months...  empagliflozin............  06- 12-    Health [facility] Embedded Care Due Messages. Reference number: [id number].   11/05/2024 8:01:22 AM CST

## 2024-11-05 NOTE — TELEPHONE ENCOUNTER
No care due was identified.  Health [facility] Embedded Care Due Messages. Reference number: [id number].   11/05/2024 4:25:56 PM CST

## 2024-11-06 RX ORDER — IRBESARTAN 150 MG/1
150 TABLET ORAL
Qty: 90 TABLET | Refills: 2 | Status: SHIPPED | OUTPATIENT
Start: 2024-11-06

## 2024-11-06 NOTE — TELEPHONE ENCOUNTER
Refill Routing Note   Medication(s) are not appropriate for processing by [facility] for the following reason(s):        Required labs abnormal    [facility] action(s):  Defer               Appointments  past 12m or future 3m with PCP    Date Provider   Last Visit   9/5/2024 Bebeto Bustamante MD   Next Visit   12/9/2024 Bebeto Bustamante MD   ED visits in past 90 days: 0        Note composed:10:20 PM 11/05/2024

## 2024-11-06 NOTE — TELEPHONE ENCOUNTER
Donta Varghese,  Please review message from this patient concerning where send the order for medical supplies.   [facility] [place]  Contact information: [phone]  Fax number: [phone]  Please review.  Thank you.  Signed:  Jocelyn Luu LPN    ----- Message from Bebeto Bustamante MD sent at 11/5/2024  6:17 PM CST -----  Regarding: DME orders  Contact: pt  Ok but ask him which DME Co to send orders to?  [facility]  ----- Message -----  From: Kavita Gibson MA  Sent: 11/5/2024  10:58 AM CST  To: Bebeto Bustamante MD    DME order pls  ----- Message -----  From: Marisabel Salomon  Sent: 11/5/2024  10:56 AM CST  To: Jose Tate Staff    Type: Needs Medical Advice         Who Called:PT  Best Call Back Number:[phone]  Additional Information: Requesting a call back regarding  Pt is asking for office to place DME orders for a Shower Chair. Pt said his balance is becoming bad and he fell the 2 days ago in the shower. (No injuries)    Please Advise- Thank you

## 2024-12-09 ENCOUNTER — OFFICE VISIT (OUTPATIENT)
Dept: FAMILY MEDICINE | Facility: CLINIC | Age: 84
End: 2024-12-09
Payer: MEDICARE

## 2024-12-09 VITALS
WEIGHT: 173 LBS | OXYGEN SATURATION: 99 % | DIASTOLIC BLOOD PRESSURE: 50 MMHG | BODY MASS INDEX: 28.82 KG/M2 | HEIGHT: 65 IN | SYSTOLIC BLOOD PRESSURE: 120 MMHG | HEART RATE: 88 BPM

## 2024-12-09 DIAGNOSIS — H81.13 BENIGN PAROXYSMAL POSITIONAL VERTIGO DUE TO BILATERAL VESTIBULAR DISORDER: ICD-10-CM

## 2024-12-09 DIAGNOSIS — I12.9 HYPERTENSIVE KIDNEY DISEASE WITH STAGE 3B CHRONIC KIDNEY DISEASE: ICD-10-CM

## 2024-12-09 DIAGNOSIS — E11.65 INADEQUATELY CONTROLLED DIABETES MELLITUS: ICD-10-CM

## 2024-12-09 DIAGNOSIS — Z00.00 PREVENTATIVE HEALTH CARE: ICD-10-CM

## 2024-12-09 DIAGNOSIS — E78.2 MIXED HYPERLIPIDEMIA: ICD-10-CM

## 2024-12-09 DIAGNOSIS — I10 ESSENTIAL HYPERTENSION, BENIGN: ICD-10-CM

## 2024-12-09 DIAGNOSIS — N18.32 CHRONIC KIDNEY DISEASE (CKD) STAGE G3B/A1, MODERATELY DECREASED GLOMERULAR FILTRATION RATE (GFR) BETWEEN 30-44 ML/MIN/1.73 SQUARE METER AND ALBUMINURIA CREATININE RATIO LESS THAN 30 MG/G: Primary | ICD-10-CM

## 2024-12-09 DIAGNOSIS — N18.32 HYPERTENSIVE KIDNEY DISEASE WITH STAGE 3B CHRONIC KIDNEY DISEASE: ICD-10-CM

## 2024-12-09 PROCEDURE — 1160F RVW MEDS BY RX/DR IN RCRD: CPT | Mod: CPTII,S$GLB,, | Performed by: INTERNAL MEDICINE

## 2024-12-09 PROCEDURE — 3074F SYST BP LT 130 MM HG: CPT | Mod: CPTII,S$GLB,, | Performed by: INTERNAL MEDICINE

## 2024-12-09 PROCEDURE — 1159F MED LIST DOCD IN RCRD: CPT | Mod: CPTII,S$GLB,, | Performed by: INTERNAL MEDICINE

## 2024-12-09 PROCEDURE — 99214 OFFICE O/P EST MOD 30 MIN: CPT | Mod: S$GLB,,, | Performed by: INTERNAL MEDICINE

## 2024-12-09 PROCEDURE — 1126F AMNT PAIN NOTED NONE PRSNT: CPT | Mod: CPTII,S$GLB,, | Performed by: INTERNAL MEDICINE

## 2024-12-09 PROCEDURE — 3078F DIAST BP <80 MM HG: CPT | Mod: CPTII,S$GLB,, | Performed by: INTERNAL MEDICINE

## 2024-12-09 PROCEDURE — G2211 COMPLEX E/M VISIT ADD ON: HCPCS | Mod: S$GLB,,, | Performed by: INTERNAL MEDICINE

## 2024-12-09 PROCEDURE — 1101F PT FALLS ASSESS-DOCD LE1/YR: CPT | Mod: CPTII,S$GLB,, | Performed by: INTERNAL MEDICINE

## 2024-12-09 PROCEDURE — 3288F FALL RISK ASSESSMENT DOCD: CPT | Mod: CPTII,S$GLB,, | Performed by: INTERNAL MEDICINE

## 2024-12-09 RX ORDER — MECLIZINE HYDROCHLORIDE 25 MG/1
25 TABLET ORAL 3 TIMES DAILY PRN
Qty: 90 TABLET | Refills: 0 | Status: SHIPPED | OUTPATIENT
Start: 2024-12-09 | End: 2025-01-08

## 2024-12-09 NOTE — ASSESSMENT & PLAN NOTE
GFR better  I discussed renal condition with patient at length  We discussed diet modification specifically decreasing salt intake, avoiding fast food ,avoiding fried food  We also discussed the importance of minimizing the use of anti-inflammatory agents like Motrin ibuprofen naproxen Aleve etc.  We discussed the importance of increasing fluid intake specifically water and also stay hydrated  We discussed minimizing the use of diuretics as much as clinically possible  We are going to monitor renal functions BUN creatinine urine microalbumin and electrolytes closely  To consider referral to Nephrology Service if renal fx continue to decline  To consider adding an SGL- 2 inhibitor if renal functions continue to decline, regardless of the blood sugar status

## 2024-12-10 NOTE — PROGRESS NOTES
Subjective:       Patient ID: Carlos Singler is a 84 y.o. male.    Chief Complaint: Dizziness (Patient states he thinks he has vertigo again. )      History of Present Illness    CHIEF COMPLAINT:  Carlos presents for a follow-up on labs and to discuss balance issues.    HPI:  Carlos reports balance problems and a recent fall, indicating a worsening condition. He previously used vertigo medication but discontinued it for an unknown reason, suggesting a lack of clarity regarding his treatment plan.    He was previously on Movi, believed to be for his neck, but a pulmonary doctor discontinued it. He currently takes two prescribed blood pressure medications: amlodipine at night and irbesartan in the morning. He also takes aspirin and vitamin D supplements.    Carlos reports difficulty driving at night due to bright lights from other cars, potentially related to his eye condition. He uses steroid drops in his left eye.    Regarding sleep patterns, the patient reports going to bed very late, sometimes at 3 or 4 AM, which impacts his ability to attend early morning appointments.    Carlos had recent labs done at [facility] [place]. The results show an improvement in his kidney function, though he still has stage 3B renal insufficiency.    Carlos denies taking water pills and Jordans. He denies needing refills on any medications other than the two blood pressure medications he is currently taking.    MEDICATIONS:  Carlos is on Amlodipine, taken at night, and Irbesartan, taken in the morning, both for blood pressure management. He is also on Aspirin, Vitamin D, and A-Reds (presumably a supplement).    MEDICAL HISTORY:  Carlos has a history of vertigo, stage 3B renal insufficiency, and hypertension. He received his flu vaccine this year at [facility]. Carlos has also received pneumonia and RSV shots. He has received COVID-19 vaccines, including the new mRNA vaccine.    TEST RESULTS:  Carlos underwent labs last week at  [facility] [place], with results described as slightly improved from previous tests. His kidney function was also assessed during the same visit, confirming stage 3B renal insufficiency, with numbers showing improvement from previous results.         Review of Systems   Constitutional:  Negative for activity change, appetite change and fever.   Respiratory: Negative.     Cardiovascular: Negative.    Gastrointestinal: Negative.    Musculoskeletal: Negative.    Neurological:  Positive for vertigo.         Objective:      Physical Exam  Vitals and nursing note reviewed.   Constitutional:       Appearance: Normal appearance.   Cardiovascular:      Rate and Rhythm: Normal rate and regular rhythm.      Pulses: Normal pulses.      Heart sounds: Normal heart sounds. No murmur heard.     No friction rub. No gallop.   Pulmonary:      Effort: Pulmonary effort is normal.      Breath sounds: Normal breath sounds. No wheezing.   Skin:     General: Skin is warm and dry.   Neurological:      General: No focal deficit present.      Mental Status: He is alert.   Psychiatric:         Mood and Affect: Mood normal.         Assessment:       1. Chronic kidney disease (CKD) stage G3b/A1, moderately decreased glomerular filtration rate (GFR) between 30-44 mL/min/1.73 square meter and albuminuria creatinine ratio less than 30 mg/g  Overview:  Better  Off lasix    Assessment & Plan:  GFR better  I discussed renal condition with patient at length  We discussed diet modification specifically decreasing salt intake, avoiding fast food ,avoiding fried food  We also discussed the importance of minimizing the use of anti-inflammatory agents like Motrin ibuprofen naproxen Aleve etc.  We discussed the importance of increasing fluid intake specifically water and also stay hydrated  We discussed minimizing the use of diuretics as much as clinically possible  We are going to monitor renal functions BUN creatinine urine microalbumin and  electrolytes closely  To consider referral to Nephrology Service if renal fx continue to decline  To consider adding an SGL- 2 inhibitor if renal functions continue to decline, regardless of the blood sugar status      Orders:  -     Microalbumin/Creatinine Ratio, Urine; Future; Expected date: 12/09/2024  -     Comprehensive Metabolic Panel; Future; Expected date: 12/09/2024    2. Essential hypertension, benign  Overview:  Blood pressure is controlled today at 112/54    Orders:  -     Microalbumin/Creatinine Ratio, Urine; Future; Expected date: 12/09/2024  -     Comprehensive Metabolic Panel; Future; Expected date: 12/09/2024    3. Mixed hyperlipidemia  -     Lipid Panel; Future; Expected date: 12/09/2024    4. Hypertensive kidney disease with stage 3b chronic kidney disease  Overview:  Stable    Assessment & Plan:  GFR improved from 36 to 40  Refill jardiance  Off lasix      5. Benign paroxysmal positional vertigo due to bilateral vestibular disorder  Overview:  No new changes    Assessment & Plan:  Refill antivert      6. Preventative health care    Other orders  -     meclizine (ANTIVERT) 25 mg tablet; Take 1 tablet (25 mg total) by mouth 3 (three) times daily as needed for Dizziness.  Dispense: 90 tablet; Refill: 0  -     empagliflozin (JARDIANCE) 10 mg tablet; Take 1 tablet (10 mg total) by mouth once daily.  Dispense: 90 tablet; Refill: 1           Plan:       Assessment & Plan    IMPRESSION:  - Restarted meclizine for vertigo and balance issues  - Assessed kidney function, noting improvement to stage 3B renal insufficiency  - Continued current blood pressure medications (amlodipine and irbesartan) with timing adjustments  - Reviewed vaccination status, including COVID-19, flu, pneumonia, and RSV  - Monitored blood pressure, which was well-controlled at 120/50    DIZZINESS AND GIDDINESS:  - Restarted meclizine, as needed, up to 3 times daily.  - Prescribed 90 pills.    ESSENTIAL HYPERTENSION:  - Continued  "amlodipine.  - Take at night.  - Continued irbesartan.  - Take in the morning.  - Continued aspirin.  - Blood pressure checked during visit.    CHRONIC KIDNEY DISEASE:  - Follow up in mid-March to recheck kidney function.    GENERAL HEALTH MAINTENANCE:  - Carlos to stay hydrated by drinking plenty of fluids.  - Carlos to bring printout of vaccination dates (COVID-19, flu, RSV) to next appointment.  - Continued vitamin D.  - Contact office if tonsils become infected.    FOLLOW-UP:  - Schedule next appointment around 1:00 PM to accommodate patient's preference for daytime driving.       Carlos Salazar" was seen today for dizziness.    Diagnoses and all orders for this visit:    Chronic kidney disease (CKD) stage G3b/A1, moderately decreased glomerular filtration rate (GFR) between 30-44 mL/min/1.73 square meter and albuminuria creatinine ratio less than 30 mg/g  -     Microalbumin/Creatinine Ratio, Urine; Future  -     Comprehensive Metabolic Panel; Future  -     Microalbumin/Creatinine Ratio, Urine  -     Comprehensive Metabolic Panel    Essential hypertension, benign  -     Microalbumin/Creatinine Ratio, Urine; Future  -     Comprehensive Metabolic Panel; Future  -     Microalbumin/Creatinine Ratio, Urine  -     Comprehensive Metabolic Panel    Mixed hyperlipidemia  -     Lipid Panel; Future  -     Lipid Panel    Hypertensive kidney disease with stage 3b chronic kidney disease    Benign paroxysmal positional vertigo due to bilateral vestibular disorder    Preventative health care    Other orders  -     meclizine (ANTIVERT) 25 mg tablet; Take 1 tablet (25 mg total) by mouth 3 (three) times daily as needed for Dizziness.  -     empagliflozin (JARDIANCE) 10 mg tablet; Take 1 tablet (10 mg total) by mouth once daily.      Visit today included increased complexity associated with the care of the episodic problem.   I addressed and managing the longitudinal care of the patient due to the serious and/or complex managed " problem(s).  .

## 2025-01-02 ENCOUNTER — OFFICE VISIT (OUTPATIENT)
Dept: FAMILY MEDICINE | Facility: CLINIC | Age: 85
End: 2025-01-02
Payer: MEDICARE

## 2025-01-02 VITALS — HEIGHT: 65 IN | HEART RATE: 92 BPM | BODY MASS INDEX: 27.99 KG/M2 | OXYGEN SATURATION: 95 % | WEIGHT: 168 LBS

## 2025-01-02 DIAGNOSIS — U07.1 COVID-19 VIRUS DETECTED: ICD-10-CM

## 2025-01-02 DIAGNOSIS — J06.9 VIRAL UPPER RESPIRATORY TRACT INFECTION: Primary | ICD-10-CM

## 2025-01-02 DIAGNOSIS — U07.1 COVID-19: ICD-10-CM

## 2025-01-02 LAB
CTP QC/QA: YES
CTP QC/QA: YES
POC MOLECULAR INFLUENZA A AGN: NEGATIVE
POC MOLECULAR INFLUENZA B AGN: NEGATIVE
SARS-COV-2 RDRP RESP QL NAA+PROBE: POSITIVE

## 2025-01-02 RX ORDER — BENZONATATE 200 MG/1
200 CAPSULE ORAL 3 TIMES DAILY PRN
Qty: 30 CAPSULE | Refills: 0 | Status: SHIPPED | OUTPATIENT
Start: 2025-01-02 | End: 2025-01-12

## 2025-01-02 NOTE — PROGRESS NOTES
Subjective:       Patient ID: Carlos Rodas is a 84 y.o. male.    Chief Complaint: Cough (Patient states he has a cough)      Patient is established already .......... presents today with a cold like illness    Cough  This is a new problem. The current episode started in the past 7 days. The problem has been rapidly improving. The cough is Non-productive. Pertinent negatives include no fever. Nothing aggravates the symptoms. Treatments tried: aleve. The treatment provided moderate relief.     Review of Systems   Constitutional:  Negative for activity change, appetite change and fever.   Respiratory:  Positive for cough.    Cardiovascular: Negative.    Gastrointestinal: Negative.    Musculoskeletal: Negative.          Objective:      Physical Exam  Vitals and nursing note reviewed.   Constitutional:       Appearance: Normal appearance.   HENT:      Head: Normocephalic and atraumatic.      Right Ear: Tympanic membrane normal.      Left Ear: Tympanic membrane normal.      Nose: Nose normal.      Mouth/Throat:      Mouth: Mucous membranes are dry.      Pharynx: No oropharyngeal exudate or posterior oropharyngeal erythema.   Cardiovascular:      Pulses: Normal pulses.      Heart sounds: Normal heart sounds.   Pulmonary:      Effort: Pulmonary effort is normal.      Breath sounds: Normal breath sounds.   Musculoskeletal:      Cervical back: Normal range of motion and neck supple. No rigidity or tenderness.   Lymphadenopathy:      Cervical: No cervical adenopathy.   Neurological:      Mental Status: He is alert.         Assessment:       1. Viral upper respiratory tract infection  Overview:  Upper Respiratory Infection: Patient complains of symptoms of a URI. Symptoms include congestion and cough. Onset of symptoms was 3 days ago, gradually improving since that time. He also c/o cough described as nonproductive for the past 2 days .  He is drinking plenty of fluids. Evaluation to date: seen previously and thought to have a  viral URI. Treatment to date: cough suppressants.          Assessment & Plan:  Increase your intake of fluids and stay hydrated  Use over-the-counter cold and sinus medicine for example DayQuil for daytime ,NyQuil for nighttime  Please add Tylenol , Aleve or Advil as needed for low-grade temperatures and soreness  Get enough rest  No need for treatment with antibiotics at the current time as your symptoms and lack of temp point to a viral or allergic disease  Please call us back or return if fever develops or symptoms progress      Orders:  -     POCT COVID-19 Rapid Screening  -     POCT Influenza A/B Molecular    2. COVID-19  Overview:  No f/c  + cough , congestion for few days  No sore throat/ swollen glands      Assessment & Plan:  Increase your intake of fluids and stay hydrated  Use over-the-counter cold and sinus medicine for example DayQuil for daytime ,NyQuil for nighttime  Please add Tylenol , Aleve or Advil as needed for low-grade temperatures and soreness  Get enough rest  No need for treatment with antibiotics at the current time as your symptoms and lack of temp point to a viral or allergic disease  Please call us back or return if fever develops or symptoms progress        Other orders  -     benzonatate (TESSALON) 200 MG capsule; Take 1 capsule (200 mg total) by mouth 3 (three) times daily as needed for Cough.  Dispense: 30 capsule; Refill: 0           Plan:       1. Viral upper respiratory tract infection  Overview:  Upper Respiratory Infection: Patient complains of symptoms of a URI. Symptoms include congestion and cough. Onset of symptoms was 3 days ago, gradually improving since that time. He also c/o cough described as nonproductive for the past 2 days .  He is drinking plenty of fluids. Evaluation to date: seen previously and thought to have a viral URI. Treatment to date: cough suppressants.          Assessment & Plan:  Increase your intake of fluids and stay hydrated  Use over-the-counter cold  and sinus medicine for example DayQuil for daytime ,NyQuil for nighttime  Please add Tylenol , Aleve or Advil as needed for low-grade temperatures and soreness  Get enough rest  No need for treatment with antibiotics at the current time as your symptoms and lack of temp point to a viral or allergic disease  Please call us back or return if fever develops or symptoms progress      Orders:  -     POCT COVID-19 Rapid Screening  -     POCT Influenza A/B Molecular    2. COVID-19  Overview:  No f/c  + cough , congestion for few days  No sore throat/ swollen glands      Assessment & Plan:  Increase your intake of fluids and stay hydrated  Use over-the-counter cold and sinus medicine for example DayQuil for daytime ,NyQuil for nighttime  Please add Tylenol , Aleve or Advil as needed for low-grade temperatures and soreness  Get enough rest  No need for treatment with antibiotics at the current time as your symptoms and lack of temp point to a viral or allergic disease  Please call us back or return if fever develops or symptoms progress        Other orders  -     benzonatate (TESSALON) 200 MG capsule; Take 1 capsule (200 mg total) by mouth 3 (three) times daily as needed for Cough.  Dispense: 30 capsule; Refill: 0

## 2025-01-09 ENCOUNTER — OFFICE VISIT (OUTPATIENT)
Dept: FAMILY MEDICINE | Facility: CLINIC | Age: 85
End: 2025-01-09
Payer: MEDICARE

## 2025-01-09 VITALS
WEIGHT: 192 LBS | TEMPERATURE: 96 F | BODY MASS INDEX: 31.99 KG/M2 | OXYGEN SATURATION: 89 % | HEIGHT: 65 IN | HEART RATE: 86 BPM

## 2025-01-09 DIAGNOSIS — J40 BRONCHITIS: ICD-10-CM

## 2025-01-09 DIAGNOSIS — J18.9 PNEUMONIA OF BOTH LUNGS DUE TO INFECTIOUS ORGANISM, UNSPECIFIED PART OF LUNG: Primary | ICD-10-CM

## 2025-01-09 RX ORDER — ALBUTEROL SULFATE 90 UG/1
2 INHALANT RESPIRATORY (INHALATION) EVERY 4 HOURS PRN
Qty: 18 G | Refills: 2 | Status: SHIPPED | OUTPATIENT
Start: 2025-01-09 | End: 2025-04-09

## 2025-01-09 RX ORDER — CEFTRIAXONE 1 G/1
1 INJECTION, POWDER, FOR SOLUTION INTRAMUSCULAR; INTRAVENOUS
Status: COMPLETED | OUTPATIENT
Start: 2025-01-09 | End: 2025-01-09

## 2025-01-09 RX ADMIN — CEFTRIAXONE 1 G: 1 INJECTION, POWDER, FOR SOLUTION INTRAMUSCULAR; INTRAVENOUS at 03:01

## 2025-01-09 NOTE — PROGRESS NOTES
Patient presents for scheduled ceftriaxone (Rocephin) injection.  Ceftriaxone 1GM given IM per provider order.  See MAR for further details.  Patient tolerated well.  No signs or symptoms of adverse reaction after 15 minutes.  Patient instructed to return in 4 weeks for test of cure.

## 2025-01-09 NOTE — PROGRESS NOTES
Subjective:       Patient ID: Carlos Singlemichelle is a 84 y.o. male.    Chief Complaint: Hospital Follow Up (Patient is here for a follow up visit. ) and Cough (Patient is here for a cough. )      History of Present Illness    CHIEF COMPLAINT:  Carlos presents with persistent cough, chills, and severe fatigue, expressing concern about a possible serious infection.    HPI:  Carlos reports onset of illness 9 days ago, on New Year's Barbie. Symptoms include persistent cough, chills, and severe fatigue. Sleep has been disturbed, with no more than 5-6 hours per night since symptom onset. Carlos reports difficulty with ambulation and rising from a seated position due to condition severity.    Carlos was seen by this practitioner 5 days ago and prescribed antibiotics. Due to pharmacy closure, medication initiation was delayed until the following day. Carlos reports previous efficacy of a liquid cough suppressant.    Carlos's temperature has consistently been 36.5°C (97.7°F) with regular monitoring showing no fluctuation in readings.    Carlos has been attempting to schedule an appointment since Monday due to symptom severity and expresses concern about their condition and overall well-being.    MEDICATIONS:  Carlos started antibiotics 9 days ago. He is also taking cough suppressant pills, which are described as small, round, and clear.    TEST RESULTS:  Carlos's temperature was measured by a healthcare provider and reported to be within the normal range. His personal thermometer consistently reads 36.5°C.    ALLERGIES:  Carlos has an allergy to NyQuil, which induces emesis.         Review of Systems   Constitutional:  Positive for fatigue and fever.        Chills   Respiratory:  Positive for cough and shortness of breath.          Objective:      Physical Exam  Vitals and nursing note reviewed.   Constitutional:       Comments: Ill looking with chills   Cardiovascular:      Rate and Rhythm: Normal rate and regular rhythm.      Pulses: Normal  pulses.      Heart sounds: Normal heart sounds. No murmur heard.     No friction rub. No gallop.   Pulmonary:      Effort: Respiratory distress present.      Breath sounds: Normal breath sounds. No wheezing.   Skin:     General: Skin is warm and dry.   Neurological:      Mental Status: He is alert.         Assessment:       1. Pneumonia of both lungs due to infectious organism, unspecified part of lung  Overview:  Started 10 days ago    Assessment & Plan:  Rocephin  Refer to ER for ? Pneumonia, sepsis    Orders:  -     cefTRIAXone injection 1 g    2. Bronchitis  Overview:  With ? pneumonia    Assessment & Plan:  Refill albuterol      Other orders  -     albuterol (PROVENTIL/VENTOLIN HFA) 90 mcg/actuation inhaler; Inhale 2 puffs into the lungs every 4 (four) hours as needed for Wheezing.  Dispense: 18 g; Refill: 2           Plan:       Assessment & Plan    IMPRESSION:  - Concerned patient may have pneumonia or sepsis due to prolonged illness, chills, and poor appearance  - Administered antibiotic injection in office  - Recommend emergency room evaluation for potential admission due to severity of symptoms    CHRONIC RESPIRATORY FAILURE, UNSPECIFIED WHETHER WITH HYPOXIA OR HYPERCAPNIA:  - Carlos reported chills and difficulty sleeping for 9 days since New Year's Barbie.  - Lung auscultation revealed normal breath sounds, but overall appearance is concerning.  - Assessed for possible pneumonia or sepsis.  - Administered intramuscular antibiotic injection in office and prescribed an inhaler for respiratory symptoms.  - Due to the severity of symptoms, referred patient to the emergency room for immediate evaluation and possible admission.    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  - Prescribed an inhaler to manage COPD symptoms, with prescription sent to [facility].  - Provided instructions on proper inhaler usage and technique.    POTENTIAL SEPSIS:  - Provided the  "patient with the doctor's contact card to present at the emergency room and instructed them to inform staff about the antibiotic injection received.    FEVER EVALUATION:  - Evaluated the patient's temperature, which was reportedly normal, but expressed skepticism due to Celsius reading.  - Assessed the possibility of fever as part of potential sepsis or pneumonia.    FATIGUE:  - Noted the patient's report of severe sleep deprivation since the onset of illness.  - Observed that the patient's appearance is consistent with significant fatigue.    MOBILITY ISSUES:  - Noted the patient's report of difficulty walking and getting up.  - Acknowledged these mobility issues as part of the overall clinical presentation.    MEDICATION ALLERGY:  - Noted the patient's report of inability to take NyQuil due to adverse reactions.  - Inquired about allergies to antibiotics and other medications.  - Avoided prescribing NyQuil and considered alternative treatments for cough management.  - Advised the patient to inform all healthcare providers about this allergy in the future.       Carlos Salazar" was seen today for hospital follow up and cough.    Diagnoses and all orders for this visit:    Pneumonia of both lungs due to infectious organism, unspecified part of lung  -     cefTRIAXone injection 1 g    Bronchitis    Other orders  -     albuterol (PROVENTIL/VENTOLIN HFA) 90 mcg/actuation inhaler; Inhale 2 puffs into the lungs every 4 (four) hours as needed for Wheezing.            Visit today included increased complexity associated with the care of the episodic problem.   I addressed and managing the longitudinal care of the patient due to the serious and/or complex managed problem(s).  .        "

## 2025-01-24 ENCOUNTER — OFFICE VISIT (OUTPATIENT)
Dept: FAMILY MEDICINE | Facility: CLINIC | Age: 85
End: 2025-01-24
Payer: MEDICARE

## 2025-01-24 VITALS
HEART RATE: 86 BPM | HEIGHT: 65 IN | SYSTOLIC BLOOD PRESSURE: 120 MMHG | DIASTOLIC BLOOD PRESSURE: 58 MMHG | OXYGEN SATURATION: 94 % | BODY MASS INDEX: 27.93 KG/M2 | WEIGHT: 167.63 LBS

## 2025-01-24 DIAGNOSIS — Z09 HOSPITAL DISCHARGE FOLLOW-UP: ICD-10-CM

## 2025-01-24 DIAGNOSIS — E78.2 MIXED HYPERLIPIDEMIA: ICD-10-CM

## 2025-01-24 DIAGNOSIS — R73.9 ELEVATED BLOOD SUGAR: ICD-10-CM

## 2025-01-24 DIAGNOSIS — I10 ESSENTIAL HYPERTENSION, BENIGN: Primary | ICD-10-CM

## 2025-01-24 PROCEDURE — 99214 OFFICE O/P EST MOD 30 MIN: CPT | Mod: S$GLB,,, | Performed by: INTERNAL MEDICINE

## 2025-01-24 PROCEDURE — 1159F MED LIST DOCD IN RCRD: CPT | Mod: CPTII,S$GLB,, | Performed by: INTERNAL MEDICINE

## 2025-01-24 PROCEDURE — 3288F FALL RISK ASSESSMENT DOCD: CPT | Mod: CPTII,S$GLB,, | Performed by: INTERNAL MEDICINE

## 2025-01-24 PROCEDURE — 1160F RVW MEDS BY RX/DR IN RCRD: CPT | Mod: CPTII,S$GLB,, | Performed by: INTERNAL MEDICINE

## 2025-01-24 PROCEDURE — 1126F AMNT PAIN NOTED NONE PRSNT: CPT | Mod: CPTII,S$GLB,, | Performed by: INTERNAL MEDICINE

## 2025-01-24 PROCEDURE — 1101F PT FALLS ASSESS-DOCD LE1/YR: CPT | Mod: CPTII,S$GLB,, | Performed by: INTERNAL MEDICINE

## 2025-01-24 NOTE — PROGRESS NOTES
Subjective:       Patient ID: Carlos Rodas is a 85 y.o. male.    Chief Complaint: Hospital Follow Up (Patient is here for a hospital follow up)      History of Present Illness    CHIEF COMPLAINT:  Carlos presents for a follow-up visit after a recent hospitalization for COVID-19.    HPI:  Carlos was diagnosed with COVID-19 15 days ago and subsequently hospitalized. He was admitted on a Thursday (the 9th) and discharged on a Saturday (the 11th), staying for 2 nights. During the hospital stay, he was placed on multiple medications, starting with 10 different prescriptions on the first day, reduced to 9 on the second day. These medications included prednisone for breathing difficulties.    Upon discharge, the patient was given prednisone pills and an inhaler. He reports a mild cough, primarily at night. A nurse called to check on the patient post-discharge, at which time he reported feeling significantly improved but still had a mild cough.    Carlos mentions frequent urination, which he attributes to discontinuation of a diuretic medication. He also notes swelling in his ankle. His oxygen levels are typically within normal range when sitting, but decrease with physical activity.    Carlos has upcoming appointments scheduled with various specialists: a pulmonologist (Dr. Macias) next week in [place], an eye doctor in March, and a cardiologist (Dr. Hendricks) on March 27th.    Carlos denies having pneumonia.    MEDICATIONS:  Carlos is on two blood pressure medications. He is also taking Megalithine for dizziness, AirEdge, and Red Stool for his eyes. Carlos is on either Vitamin B or D. He uses an inhaler infrequently.    MEDICAL HISTORY:  Carlos has a history of COVID-19 with a recent hospitalization.    SURGICAL HISTORY:  Carlos has undergone knee and shoulder surgeries, both performed by Dr. Gutierrez. He also had eye surgery performed by Dr. Zaragoza. He has had multiple colonoscopies performed by Dr. Gupta.    MITZI  "RESULTS:  Carlos had an echocardiography at an unspecified date. The results were not provided, and it was noted as "old".    IMAGING:  A chest XR was performed on the 9th (date not specified). The results showed no pneumonia and no problems were noted.         Review of Systems   Constitutional:  Negative for activity change, appetite change and fever.   Respiratory: Negative.     Cardiovascular: Negative.    Gastrointestinal: Negative.    Musculoskeletal: Negative.          Objective:      Physical Exam  Vitals and nursing note reviewed.   Constitutional:       Appearance: Normal appearance.      Comments: Elderly frail white male, using a walker for ambulation. NAD   Cardiovascular:      Rate and Rhythm: Normal rate and regular rhythm.      Pulses: Normal pulses.      Heart sounds: Normal heart sounds. No murmur heard.     No friction rub. No gallop.   Pulmonary:      Effort: Pulmonary effort is normal.      Breath sounds: Normal breath sounds. No wheezing.   Skin:     General: Skin is warm and dry.   Neurological:      Mental Status: He is alert.   Psychiatric:         Mood and Affect: Mood normal.         Assessment:       1. Essential hypertension, benign  Overview:  Blood pressure is controlled today at 112/54    Orders:  -     Microalbumin/Creatinine Ratio, Urine; Future; Expected date: 01/24/2025  -     Comprehensive Metabolic Panel; Future; Expected date: 01/24/2025    2. Mixed hyperlipidemia  -     Lipid Panel; Future; Expected date: 01/24/2025    3. Elevated blood sugar  -     Hemoglobin A1C; Future; Expected date: 01/24/2025    4. Hospital discharge follow-up  Overview:  TCM VISIT : 1/24/25    Admit date : 1/9/25  Med Reconcilliation Completed : Yes, reviewed with patient  DME Orders : no new equipments  Referrals : n/a  Education : Diagnosis & treatment d/w patient  Discharge Date : 1/11/25  Discharge Diagnosis :COPD exacerbation, URI, COVID  48 Hour TCM Call : per Office RN                       "   Plan:       Assessment & Plan    IMPRESSION:  - Reviewed patient's recent hospital admission for COVID-19  - Assessed current symptoms, noting occasional nighttime cough  - Evaluated need for medication adjustments post-hospitalization  - Considered potential need for Jardiance, pending current lab results    INADEQUATELY CONTROLLED DIABETES MELLITUS:  - Advised patient to fill previously prescribed Jardiance to improve blood sugar control and diabetes management.    CHRONIC RESPIRATORY FAILURE, UNSPECIFIED WHETHER WITH HYPOXIA OR HYPERCAPNIA:  - Continued AirEdge medication.  - Carlos reports coughing, especially at night, after COVID-related hospitalization.  - Oxygen levels are good when sitting but decrease with movement.  - Previously ordered chest XR ruled out pneumonia.  - Acknowledged patient's COVID history, reviewing diagnosis and treatment provided, including prednisone and inhaler prescribed upon discharge.  - Scheduled follow-up visit with pulmonologist Dr. Macias next Thursday in [place].    PULMONARY HYPERTENSION:  - Scheduled upcoming appointment with cardiologist Dr. Hendricks in March.    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  - Noted patient has an inhaler but reports not using it much.  - Advised to continue inhaler use as needed.  - Follow-up appointment with pulmonologist Dr. Macias in [place] already scheduled (see Chronic Respiratory Failure section).    EXUDATIVE AGE-RELATED MACULAR DEGENERATION, RIGHT EYE, WITH INACTIVE CHOROIDAL NEOVASCULARIZATION:  - Continued AirEdge (likely AREDS) for eye health.  - Scheduled upcoming appointment with ophthalmologist in March.    CHRONIC KIDNEY DISEASE (CKD) STAGE G3B/A1, MODERATELY DECREASED GFR BETWEEN 30-44 ML/MIN/1.73 SQUARE METER AND ALBUMINURIA CREATININE RATIO LESS THAN 30 MG/G:  - Ordered labs to check kidney function and other parameters.  - Instructed patient to complete  "ordered labs at [facility] before next visit.  - Plan to recheck kidney function with upcoming labs.  - Reminded patient to fill previously prescribed Jardiance, which is beneficial for both kidney and heart health (see Diabetes section).    COVID-19:  - Carlos was hospitalized for 2 nights due to .    HYPERTENSION:  - Continued two antihypertensive medications.  - Carlos's blood pressure is monitored and reported as good.    DIZZINESS:  - Continued meclizine for dizziness.    EYE CONDITION:  - Continued eye medication (presumed to be artificial tears).  - Carlos has had eye surgery performed by Dr. Zaragoza.    JOINT REPLACEMENTS:  - Carlos mentions having both knee and shoulder replacements done at the hospital.    GASTROINTESTINAL CONDITION:  - Carlos has had multiple colonoscopies performed by Dr. Gupta.       Carlos Salazar" was seen today for hospital follow up.    Diagnoses and all orders for this visit:    Essential hypertension, benign  -     Microalbumin/Creatinine Ratio, Urine; Future  -     Comprehensive Metabolic Panel; Future  -     Microalbumin/Creatinine Ratio, Urine  -     Comprehensive Metabolic Panel    Mixed hyperlipidemia  -     Lipid Panel; Future  -     Lipid Panel    Elevated blood sugar  -     Hemoglobin A1C; Future  -     Hemoglobin A1C    Hospital discharge follow-up                "

## 2025-02-24 ENCOUNTER — OFFICE VISIT (OUTPATIENT)
Dept: FAMILY MEDICINE | Facility: CLINIC | Age: 85
End: 2025-02-24
Payer: MEDICARE

## 2025-02-24 ENCOUNTER — LAB VISIT (OUTPATIENT)
Dept: LAB | Facility: CLINIC | Age: 85
End: 2025-02-24
Payer: MEDICARE

## 2025-02-24 VITALS
OXYGEN SATURATION: 91 % | HEART RATE: 75 BPM | DIASTOLIC BLOOD PRESSURE: 60 MMHG | BODY MASS INDEX: 28.07 KG/M2 | WEIGHT: 168.5 LBS | SYSTOLIC BLOOD PRESSURE: 125 MMHG | HEIGHT: 65 IN

## 2025-02-24 DIAGNOSIS — I10 ESSENTIAL HYPERTENSION, BENIGN: ICD-10-CM

## 2025-02-24 DIAGNOSIS — N18.31 STAGE 3A CHRONIC KIDNEY DISEASE: ICD-10-CM

## 2025-02-24 DIAGNOSIS — H35.3212 EXUDATIVE AGE-RELATED MACULAR DEGENERATION, RIGHT EYE, WITH INACTIVE CHOROIDAL NEOVASCULARIZATION: ICD-10-CM

## 2025-02-24 DIAGNOSIS — I10 ESSENTIAL HYPERTENSION, BENIGN: Primary | ICD-10-CM

## 2025-02-24 DIAGNOSIS — R53.83 FATIGUE, UNSPECIFIED TYPE: ICD-10-CM

## 2025-02-24 DIAGNOSIS — R60.0 LEG EDEMA, RIGHT: ICD-10-CM

## 2025-02-24 DIAGNOSIS — N18.32 CHRONIC KIDNEY DISEASE (CKD) STAGE G3B/A1, MODERATELY DECREASED GLOMERULAR FILTRATION RATE (GFR) BETWEEN 30-44 ML/MIN/1.73 SQUARE METER AND ALBUMINURIA CREATININE RATIO LESS THAN 30 MG/G: ICD-10-CM

## 2025-02-24 DIAGNOSIS — D45 POLYCYTHEMIA VERA: ICD-10-CM

## 2025-02-24 DIAGNOSIS — I27.20 PULMONARY HYPERTENSION: ICD-10-CM

## 2025-02-24 DIAGNOSIS — M45.9 ANKYLOSING SPONDYLITIS, UNSPECIFIED SITE OF SPINE: ICD-10-CM

## 2025-02-24 LAB
ALBUMIN SERPL BCP-MCNC: 3.8 G/DL (ref 3.5–5.2)
ALP SERPL-CCNC: 89 U/L (ref 40–150)
ALT SERPL W/O P-5'-P-CCNC: 9 U/L (ref 10–44)
ANION GAP SERPL CALC-SCNC: 5 MMOL/L (ref 8–16)
AST SERPL-CCNC: 14 U/L (ref 10–40)
BILIRUB SERPL-MCNC: 1.5 MG/DL (ref 0.1–1)
BUN SERPL-MCNC: 19 MG/DL (ref 8–23)
CALCIUM SERPL-MCNC: 9.4 MG/DL (ref 8.7–10.5)
CHLORIDE SERPL-SCNC: 112 MMOL/L (ref 95–110)
CO2 SERPL-SCNC: 27 MMOL/L (ref 23–29)
CREAT SERPL-MCNC: 1.5 MG/DL (ref 0.5–1.4)
EST. GFR  (NO RACE VARIABLE): 45.1 ML/MIN/1.73 M^2
GLUCOSE SERPL-MCNC: 97 MG/DL (ref 70–110)
POTASSIUM SERPL-SCNC: 4.2 MMOL/L (ref 3.5–5.1)
PROT SERPL-MCNC: 6.6 G/DL (ref 6–8.4)
SODIUM SERPL-SCNC: 144 MMOL/L (ref 136–145)

## 2025-02-24 PROCEDURE — 36415 COLL VENOUS BLD VENIPUNCTURE: CPT | Mod: ,,, | Performed by: INTERNAL MEDICINE

## 2025-02-24 PROCEDURE — 80053 COMPREHEN METABOLIC PANEL: CPT | Performed by: INTERNAL MEDICINE

## 2025-02-24 PROCEDURE — 99214 OFFICE O/P EST MOD 30 MIN: CPT | Mod: S$GLB,,, | Performed by: INTERNAL MEDICINE

## 2025-02-24 PROCEDURE — 82570 ASSAY OF URINE CREATININE: CPT | Performed by: INTERNAL MEDICINE

## 2025-02-24 PROCEDURE — 1159F MED LIST DOCD IN RCRD: CPT | Mod: CPTII,S$GLB,, | Performed by: INTERNAL MEDICINE

## 2025-02-24 PROCEDURE — 1101F PT FALLS ASSESS-DOCD LE1/YR: CPT | Mod: CPTII,S$GLB,, | Performed by: INTERNAL MEDICINE

## 2025-02-24 PROCEDURE — 1126F AMNT PAIN NOTED NONE PRSNT: CPT | Mod: CPTII,S$GLB,, | Performed by: INTERNAL MEDICINE

## 2025-02-24 PROCEDURE — 3288F FALL RISK ASSESSMENT DOCD: CPT | Mod: CPTII,S$GLB,, | Performed by: INTERNAL MEDICINE

## 2025-02-24 PROCEDURE — 1160F RVW MEDS BY RX/DR IN RCRD: CPT | Mod: CPTII,S$GLB,, | Performed by: INTERNAL MEDICINE

## 2025-02-24 PROCEDURE — G2211 COMPLEX E/M VISIT ADD ON: HCPCS | Mod: S$GLB,,, | Performed by: INTERNAL MEDICINE

## 2025-02-24 NOTE — PROGRESS NOTES
Subjective:       Patient ID: Carlos Rodas is a 85 y.o. male.    Chief Complaint: Results (Patient is here for lab results)      History of Present Illness    CHIEF COMPLAINT:  Carlos presents for a follow-up visit to discuss labs results and address concerns about mobility.    HPI:  Carlos reports difficulty walking long distances and uses a walker for mobility. He expresses a desire for an electric scooter to improve his mobility, stating he has limited walking capacity and endurance. He mentions having another walker with wheels but finds it too heavy to load into his truck. He uses electric scooters when available at places like [employer], [place], and malls.    He notes swelling in his right ankle compared to the left, which has been present for an extended period. He previously took diuretics for this issue but discontinued them due to concerns about his kidneys. He had a history of a blood clot in the past, for which a pulmonary doctor had prescribed diuretics.    Regarding diabetes management, his sugar levels are well-controlled and that he monitors his intake carefully. He also mentions not adding salt to his food and drinking at least 2 bottles of water a day to stay hydrated.    He denies shortness of breath, chest pain, and breathing problems.    MEDICATIONS:  Carlos has discontinued water pills (diuretics) due to kidney concerns.    MEDICAL HISTORY:  Carlos has a history of a blood clot occurring one time in the past. He also has a history of decreased kidney function. Carlos received a flu vaccine last year.    TEST RESULTS:  Carlos's cholesterol level is 166, and his A1c is 5.8. His kidney function was previously decreased.    IMAGING:  A lower extremity ultrasound is scheduled after hospital discharge to check for blood clots.         Review of Systems   Constitutional:  Negative for activity change, appetite change and fever.   Respiratory: Negative.     Cardiovascular:  Positive for leg swelling.    Gastrointestinal: Negative.    Musculoskeletal: Negative.          Objective:      Physical Exam  Vitals and nursing note reviewed.   Constitutional:       Appearance: Normal appearance. He is obese.   Cardiovascular:      Rate and Rhythm: Normal rate and regular rhythm.      Pulses: Normal pulses.      Heart sounds: Normal heart sounds. No murmur heard.     No friction rub. No gallop.   Pulmonary:      Effort: Pulmonary effort is normal.      Breath sounds: Normal breath sounds. No wheezing.   Musculoskeletal:      Right lower leg: Edema present.   Skin:     General: Skin is warm and dry.   Neurological:      Mental Status: He is alert.   Psychiatric:         Mood and Affect: Mood normal.         Assessment:       1. Essential hypertension, benign  Overview:  Blood pressure is controlled today at 112/54    Orders:  -     Comprehensive Metabolic Panel; Future; Expected date: 02/24/2025  -     Microalbumin/Creatinine Ratio, Urine; Future; Expected date: 02/24/2025    2. Fatigue, unspecified type  -     Comprehensive Metabolic Panel; Future; Expected date: 02/24/2025  -     Microalbumin/Creatinine Ratio, Urine; Future; Expected date: 02/24/2025    3. Leg edema, right  Overview:  Chronic     Assessment & Plan:  R/o DVT  Get US    Orders:  -     VAS US Venous Leg Right; Future    4. Stage 3a chronic kidney disease  Overview:  Better  Off lasix    Assessment & Plan:  Recheck today      5. Polycythemia vera  Assessment & Plan:  Stable      6. Ankylosing spondylitis, unspecified site of spine  Assessment & Plan:  Stable  Monitor      7. Pulmonary hypertension  Assessment & Plan:  Stable        8. Exudative age-related macular degeneration, right eye, with inactive choroidal neovascularization  Assessment & Plan:  Stable F/u with Eye         9. Chronic kidney disease (CKD) stage G3b/A1, moderately decreased glomerular filtration rate (GFR) between 30-44 mL/min/1.73 square meter and albuminuria creatinine ratio less than  30 mg/g  Overview:  Stable    Assessment & Plan:  Stable  Recheck today             Plan:       Assessment & Plan    IMPRESSION:  - Reviewed labs results: cholesterol 166, A1c 5.8  - Noted kidney function was not ordered in previous labs, decided to check GFR  - Assessed patient's complaint of right ankle swelling  - Considered possibility of blood clot, ordered ultrasound to rule out  - Evaluated need for flu vaccination    INADEQUATELY CONTROLLED DIABETES MELLITUS:  - Carlos's blood sugar and A1c (5.8) are reported to be very good.  - Carlos is successfully managing diabetes through diet, including avoiding salt in food.    CHRONIC RESPIRATORY FAILURE, UNSPECIFIED WHETHER WITH HYPOXIA OR HYPERCAPNIA:  - Carlos denies any current breathing problems or shortness of breath.    PULMONARY HYPERTENSION:  - Carlos denies any current breathing problems or chest pain related to pulmonary hypertension.    CHRONIC KIDNEY DISEASE (CKD) STAGE G3B/A1, MODERATELY DECREASED GFR BETWEEN 30-44 ML/MIN/1.73 SQUARE METER AND ALBUMINURIA CREATININE RATIO LESS THAN 30 MG/G:  - Ordered blood sample for kidney function (GFR) check, with follow-up scheduled after receiving results.  - Previous tests showed slightly decreased kidney function.  - Carlos discontinued diuretics due to kidney concerns.  - Advised to stay hydrated and drink plenty of water.    RIGHT ANKLE SWELLING:  - Examined the patient's right ankle, noting slight swelling.  - Carlos reports difficulty walking long distances and uses a walker.    SUSPECTED BLOOD CLOT:  - Considering the possibility of a blood clot due to ankle swelling.  - Ordered ultrasound of right ankle to rule out blood clot.  - Inquired about patient's history of blood clots.  - Scheduled follow-up after receiving ultrasound results.    MOBILITY ASSISTANCE:  - Assessed the patient as too healthy for an electric scooter in response to their inquiry.  - Advised on alternative options for mobility  "assistance.    HYPERTENSION:  - Carlos to continue current hydration habits, drinking at least a few bottles of water daily.       Carlos Salazar" was seen today for results.    Diagnoses and all orders for this visit:    Essential hypertension, benign  -     Comprehensive Metabolic Panel; Future  -     Microalbumin/Creatinine Ratio, Urine; Future    Fatigue, unspecified type  -     Comprehensive Metabolic Panel; Future  -     Microalbumin/Creatinine Ratio, Urine; Future    Leg edema, right  -     VAS US Venous Leg Right; Future    Stage 3a chronic kidney disease    Polycythemia vera    Ankylosing spondylitis, unspecified site of spine    Pulmonary hypertension    Exudative age-related macular degeneration, right eye, with inactive choroidal neovascularization    Chronic kidney disease (CKD) stage G3b/A1, moderately decreased glomerular filtration rate (GFR) between 30-44 mL/min/1.73 square meter and albuminuria creatinine ratio less than 30 mg/g              "

## 2025-02-25 LAB
ALBUMIN/CREAT UR: 56.3 UG/MG (ref 0–30)
CREAT UR-MCNC: 160 MG/DL (ref 23–375)
MICROALBUMIN UR DL<=1MG/L-MCNC: 90 UG/ML

## 2025-06-09 ENCOUNTER — TELEPHONE (OUTPATIENT)
Dept: FAMILY MEDICINE | Facility: CLINIC | Age: 85
End: 2025-06-09
Payer: MEDICARE

## 2025-06-09 NOTE — TELEPHONE ENCOUNTER
Type:  Needs Medical Advice    Who Called: pt  Would the patient rather a call back or a response via [facility]? Call back   Best Call Back Number: [phone]   Additional Information: pt wants to know if he needs to get lab work done. Please call back

## 2025-06-11 ENCOUNTER — TELEPHONE (OUTPATIENT)
Dept: FAMILY MEDICINE | Facility: CLINIC | Age: 85
End: 2025-06-11
Payer: MEDICARE

## 2025-06-11 NOTE — TELEPHONE ENCOUNTER
Were reaching out to inform you that your upcoming appointment needs to be rescheduled, as Dr. Varghese is no longer with our practice.  Please give us a call at your earliest convenience to reschedule with another provider at our location.  We apologize for any inconvenience this may cause and appreciate your understanding.

## 2025-06-14 ENCOUNTER — TELEPHONE (OUTPATIENT)
Dept: PHARMACY | Facility: CLINIC | Age: 85
End: 2025-06-14
Payer: MEDICARE

## 2025-06-14 NOTE — TELEPHONE ENCOUNTER
[facility]/Population Health Chart Review & Patient Outreach Details For Medication Adherence Project    Reason for Outreach Encounter: 3rd Party payor non-compliance report ([employer], [employer], C, etc)  2.  Patient Outreach Method: Reviewed patient chart   3.   Medication in question:    Hypertension Medications              amLODIPine (NORVASC) 10 MG tablet TAKE ONE TABLET BY MOUTH EVERY DAY    irbesartan (AVAPRO) 150 MG tablet TAKE ONE TABLET BY MOUTH EVERY DAY                 irbesartan  last filled  5/21 for 90 day supply      4.  Reviewed and or Updates Made To: Patient Chart  5. Outreach Outcomes and/or actions taken: Patient filled medication and is on track to be adherent  Additional Notes: